# Patient Record
Sex: MALE | Race: WHITE | NOT HISPANIC OR LATINO | Employment: OTHER | URBAN - METROPOLITAN AREA
[De-identification: names, ages, dates, MRNs, and addresses within clinical notes are randomized per-mention and may not be internally consistent; named-entity substitution may affect disease eponyms.]

---

## 2017-03-13 RX ORDER — LISINOPRIL 40 MG/1
TABLET ORAL
Qty: 90 TABLET | Refills: 0 | Status: SHIPPED | OUTPATIENT
Start: 2017-03-13 | End: 2017-06-11 | Stop reason: SDUPTHER

## 2017-03-30 ENCOUNTER — OFFICE VISIT (OUTPATIENT)
Dept: INTERNAL MEDICINE | Facility: CLINIC | Age: 57
End: 2017-03-30

## 2017-03-30 VITALS
HEART RATE: 56 BPM | TEMPERATURE: 98 F | SYSTOLIC BLOOD PRESSURE: 122 MMHG | DIASTOLIC BLOOD PRESSURE: 84 MMHG | BODY MASS INDEX: 27.63 KG/M2 | WEIGHT: 234 LBS | RESPIRATION RATE: 16 BRPM | HEIGHT: 77 IN

## 2017-03-30 DIAGNOSIS — Z23 IMMUNIZATION DUE: ICD-10-CM

## 2017-03-30 DIAGNOSIS — E78.5 HYPERLIPIDEMIA, UNSPECIFIED HYPERLIPIDEMIA TYPE: ICD-10-CM

## 2017-03-30 DIAGNOSIS — K21.9 GASTROESOPHAGEAL REFLUX DISEASE WITHOUT ESOPHAGITIS: ICD-10-CM

## 2017-03-30 DIAGNOSIS — E11.9 TYPE 2 DIABETES MELLITUS WITHOUT COMPLICATION, WITHOUT LONG-TERM CURRENT USE OF INSULIN (HCC): ICD-10-CM

## 2017-03-30 DIAGNOSIS — I10 ESSENTIAL HYPERTENSION: Primary | ICD-10-CM

## 2017-03-30 DIAGNOSIS — Z00.00 PHYSICAL EXAM: ICD-10-CM

## 2017-03-30 DIAGNOSIS — N40.0 ENLARGED PROSTATE WITHOUT LOWER URINARY TRACT SYMPTOMS (LUTS): ICD-10-CM

## 2017-03-30 LAB
A/C: NORMAL
ALBUMIN SERPL-MCNC: 4.6 G/DL (ref 3.2–4.8)
ALBUMIN/GLOB SERPL: 1.8 G/DL (ref 1.5–2.5)
ALP SERPL-CCNC: 65 U/L (ref 25–100)
ALT SERPL W P-5'-P-CCNC: 29 U/L (ref 7–40)
ANION GAP SERPL CALCULATED.3IONS-SCNC: 4 MMOL/L (ref 3–11)
ARTICHOKE IGE QN: 95 MG/DL (ref 0–130)
AST SERPL-CCNC: 32 U/L (ref 0–33)
BILIRUB BLD-MCNC: NEGATIVE MG/DL
BILIRUB SERPL-MCNC: 1.1 MG/DL (ref 0.3–1.2)
BUN BLD-MCNC: 15 MG/DL (ref 9–23)
BUN/CREAT SERPL: 16.7 (ref 7–25)
CALCIUM SPEC-SCNC: 10.1 MG/DL (ref 8.7–10.4)
CHLORIDE SERPL-SCNC: 105 MMOL/L (ref 99–109)
CHOLEST SERPL-MCNC: 160 MG/DL (ref 0–200)
CLARITY, POC: CLEAR
CO2 SERPL-SCNC: 31 MMOL/L (ref 20–31)
COLOR UR: YELLOW
CREAT BLD-MCNC: 0.9 MG/DL (ref 0.6–1.3)
EXPIRATION DATE: NORMAL
EXPIRATION DATE: NORMAL
GFR SERPL CREATININE-BSD FRML MDRD: 87 ML/MIN/1.73
GLOBULIN UR ELPH-MCNC: 2.5 GM/DL
GLUCOSE BLD-MCNC: 117 MG/DL (ref 70–100)
GLUCOSE UR STRIP-MCNC: NEGATIVE MG/DL
HBA1C MFR BLD: 6.2 % (ref 4.8–5.6)
HCV AB SER DONR QL: NORMAL
HDLC SERPL-MCNC: 43 MG/DL (ref 40–60)
KETONES UR QL: NEGATIVE
LEUKOCYTE EST, POC: NEGATIVE
Lab: NORMAL
Lab: NORMAL
NITRITE UR-MCNC: NEGATIVE MG/ML
PH UR: 6.5 [PH] (ref 5–8)
POC CREATININE URINE: 10
POC MICROALBUMIN URINE: 10
POTASSIUM BLD-SCNC: 4.5 MMOL/L (ref 3.5–5.5)
PROT SERPL-MCNC: 7.1 G/DL (ref 5.7–8.2)
PROT UR STRIP-MCNC: NEGATIVE MG/DL
PSA SERPL-MCNC: 0.39 NG/ML (ref 0–4)
RBC # UR STRIP: NEGATIVE /UL
SODIUM BLD-SCNC: 140 MMOL/L (ref 132–146)
SP GR UR: 1.01 (ref 1–1.03)
TRIGL SERPL-MCNC: 140 MG/DL (ref 0–150)
UROBILINOGEN UR QL: NORMAL

## 2017-03-30 PROCEDURE — 99396 PREV VISIT EST AGE 40-64: CPT | Performed by: INTERNAL MEDICINE

## 2017-03-30 PROCEDURE — 90471 IMMUNIZATION ADMIN: CPT | Performed by: INTERNAL MEDICINE

## 2017-03-30 PROCEDURE — 84153 ASSAY OF PSA TOTAL: CPT | Performed by: INTERNAL MEDICINE

## 2017-03-30 PROCEDURE — 80053 COMPREHEN METABOLIC PANEL: CPT | Performed by: INTERNAL MEDICINE

## 2017-03-30 PROCEDURE — 81003 URINALYSIS AUTO W/O SCOPE: CPT | Performed by: INTERNAL MEDICINE

## 2017-03-30 PROCEDURE — 82044 UR ALBUMIN SEMIQUANTITATIVE: CPT | Performed by: INTERNAL MEDICINE

## 2017-03-30 PROCEDURE — 36415 COLL VENOUS BLD VENIPUNCTURE: CPT | Performed by: INTERNAL MEDICINE

## 2017-03-30 PROCEDURE — 83036 HEMOGLOBIN GLYCOSYLATED A1C: CPT | Performed by: INTERNAL MEDICINE

## 2017-03-30 PROCEDURE — 86803 HEPATITIS C AB TEST: CPT | Performed by: INTERNAL MEDICINE

## 2017-03-30 PROCEDURE — 90670 PCV13 VACCINE IM: CPT | Performed by: INTERNAL MEDICINE

## 2017-03-30 PROCEDURE — 80061 LIPID PANEL: CPT | Performed by: INTERNAL MEDICINE

## 2017-06-12 RX ORDER — LISINOPRIL 40 MG/1
TABLET ORAL
Qty: 90 TABLET | Refills: 0 | Status: SHIPPED | OUTPATIENT
Start: 2017-06-12 | End: 2017-09-10 | Stop reason: SDUPTHER

## 2017-06-12 RX ORDER — SIMVASTATIN 40 MG
TABLET ORAL
Qty: 90 TABLET | Refills: 0 | Status: SHIPPED | OUTPATIENT
Start: 2017-06-12 | End: 2017-09-10 | Stop reason: SDUPTHER

## 2017-08-28 RX ORDER — TADALAFIL 5 MG
TABLET ORAL
Qty: 30 TABLET | Refills: 5 | Status: SHIPPED | OUTPATIENT
Start: 2017-08-28 | End: 2018-08-29 | Stop reason: SDUPTHER

## 2017-09-11 RX ORDER — LISINOPRIL 40 MG/1
TABLET ORAL
Qty: 90 TABLET | Refills: 0 | Status: SHIPPED | OUTPATIENT
Start: 2017-09-11 | End: 2017-12-10 | Stop reason: SDUPTHER

## 2017-09-11 RX ORDER — SIMVASTATIN 40 MG
TABLET ORAL
Qty: 90 TABLET | Refills: 0 | Status: SHIPPED | OUTPATIENT
Start: 2017-09-11 | End: 2017-12-10 | Stop reason: SDUPTHER

## 2017-09-26 ENCOUNTER — OFFICE VISIT (OUTPATIENT)
Dept: INTERNAL MEDICINE | Facility: CLINIC | Age: 57
End: 2017-09-26

## 2017-09-26 VITALS
RESPIRATION RATE: 18 BRPM | DIASTOLIC BLOOD PRESSURE: 70 MMHG | TEMPERATURE: 97.7 F | SYSTOLIC BLOOD PRESSURE: 112 MMHG | WEIGHT: 234 LBS | BODY MASS INDEX: 28.11 KG/M2 | HEART RATE: 64 BPM

## 2017-09-26 DIAGNOSIS — K21.9 GASTROESOPHAGEAL REFLUX DISEASE WITHOUT ESOPHAGITIS: ICD-10-CM

## 2017-09-26 DIAGNOSIS — E11.9 TYPE 2 DIABETES MELLITUS WITHOUT COMPLICATION, WITHOUT LONG-TERM CURRENT USE OF INSULIN (HCC): ICD-10-CM

## 2017-09-26 DIAGNOSIS — I10 ESSENTIAL HYPERTENSION: Primary | ICD-10-CM

## 2017-09-26 DIAGNOSIS — E78.5 HYPERLIPIDEMIA, UNSPECIFIED HYPERLIPIDEMIA TYPE: ICD-10-CM

## 2017-09-26 LAB
ALBUMIN SERPL-MCNC: 4.6 G/DL (ref 3.2–4.8)
ALBUMIN/GLOB SERPL: 2.2 G/DL (ref 1.5–2.5)
ALP SERPL-CCNC: 75 U/L (ref 25–100)
ALT SERPL W P-5'-P-CCNC: 32 U/L (ref 7–40)
ANION GAP SERPL CALCULATED.3IONS-SCNC: 0 MMOL/L (ref 3–11)
ARTICHOKE IGE QN: 105 MG/DL (ref 0–130)
AST SERPL-CCNC: 32 U/L (ref 0–33)
BILIRUB SERPL-MCNC: 1 MG/DL (ref 0.3–1.2)
BUN BLD-MCNC: 14 MG/DL (ref 9–23)
BUN/CREAT SERPL: 14 (ref 7–25)
CALCIUM SPEC-SCNC: 9.2 MG/DL (ref 8.7–10.4)
CHLORIDE SERPL-SCNC: 106 MMOL/L (ref 99–109)
CHOLEST SERPL-MCNC: 162 MG/DL (ref 0–200)
CO2 SERPL-SCNC: 33 MMOL/L (ref 20–31)
CREAT BLD-MCNC: 1 MG/DL (ref 0.6–1.3)
GFR SERPL CREATININE-BSD FRML MDRD: 76 ML/MIN/1.73
GLOBULIN UR ELPH-MCNC: 2.1 GM/DL
GLUCOSE BLD-MCNC: 129 MG/DL (ref 70–100)
HBA1C MFR BLD: 6.2 % (ref 4.8–5.6)
HDLC SERPL-MCNC: 44 MG/DL (ref 40–60)
POTASSIUM BLD-SCNC: 4.3 MMOL/L (ref 3.5–5.5)
PROT SERPL-MCNC: 6.7 G/DL (ref 5.7–8.2)
SODIUM BLD-SCNC: 139 MMOL/L (ref 132–146)
TRIGL SERPL-MCNC: 111 MG/DL (ref 0–150)

## 2017-09-26 PROCEDURE — 83036 HEMOGLOBIN GLYCOSYLATED A1C: CPT | Performed by: INTERNAL MEDICINE

## 2017-09-26 PROCEDURE — 80053 COMPREHEN METABOLIC PANEL: CPT | Performed by: INTERNAL MEDICINE

## 2017-09-26 PROCEDURE — 80061 LIPID PANEL: CPT | Performed by: INTERNAL MEDICINE

## 2017-09-26 PROCEDURE — 36415 COLL VENOUS BLD VENIPUNCTURE: CPT | Performed by: INTERNAL MEDICINE

## 2017-09-26 PROCEDURE — 99214 OFFICE O/P EST MOD 30 MIN: CPT | Performed by: INTERNAL MEDICINE

## 2017-09-26 NOTE — PROGRESS NOTES
Chief Complaint   Patient presents with   • Follow-up     6 Month       History of Present Illness    The patient is not on medication for his gastroesophageal reflux. He reports no abdominal pain, belching, chest pain, diarrhea, dysphagia, early satiety, heartburn, hoarseness, nausea, odynophagia, rectal bleeding, vomiting or weight loss. The GERD has no known aggravating factors.    The patient presents for a follow-up related to hypertension. The patient reports that he has had no headaches, dyspnea, edema, syncope, blurred vision or palpitations. He states that he is taking his medication as prescribed. He is not having medication side effects. He does not check his blood pressures at home.    The patient presents for a follow-up related to hyperlipidemia. He is following a low fat diet. He reports that he is exercising. He is taking simvastatin. The patient is taking his medication as prescribed. He reports that he has experienced muscle cramps but denies abdominal pain, headaches or weakness. He denies orthopnea, paroxysmal nocturnal dyspnea or dyspnea on exertion.    The patient presents for a follow-up related to diabetes and reports that he doesn't check his blood sugars at home. He denies hypoglycemic symptoms. The patient denies polyuria, polydypsia or polyphagia. He reports no symptoms of neuropathy. The patient is not on medication for his diabetes. The patient does check his feet daily at home.    Review of Systems    CARDIOVASCULAR- Denies Claudication.    GASTROINTESTINAL- Denies Constipation.    PULMONARY- Denies Wheezing, Sputum Production, Cough, Hemoptysis or Pleuritic Chest Pain.    Medications      Current Outpatient Prescriptions:   •  aspirin (ASPIRIN LOW DOSE) 81 MG tablet, Take  by mouth daily., Disp: , Rfl:   •  CIALIS 5 MG tablet, TAKE ONE TABLET BY MOUTH DAILY, Disp: 30 tablet, Rfl: 5  •  fluticasone (FLONASE) 50 MCG/ACT nasal spray, into each nostril Daily As Needed., Disp: , Rfl:   •   lisinopril (PRINIVIL,ZESTRIL) 40 MG tablet, TAKE 1 TABLET DAILY, Disp: 90 tablet, Rfl: 0  •  simvastatin (ZOCOR) 40 MG tablet, TAKE 1 TABLET DAILY, Disp: 90 tablet, Rfl: 0     Allergies    No Known Allergies    Problem List    Patient Active Problem List   Diagnosis   • Diabetes mellitus   • Hyperlipidemia   • Hypertension   • Gastroesophageal reflux disease   • Enlarged prostate without lower urinary tract symptoms (luts)     Social History     Social History   • Marital status:      Spouse name: N/A   • Number of children: N/A   • Years of education: N/A     Occupational History   • Not on file.     Social History Main Topics   • Smoking status: Never Smoker   • Smokeless tobacco: Not on file   • Alcohol use No   • Drug use: Not on file   • Sexual activity: Not on file     Other Topics Concern   • Not on file     Social History Narrative       Medications, Allergies, Problems List and Past History were reviewed and updated.    Physical Examination    /70 (BP Location: Left arm, Patient Position: Sitting, Cuff Size: Adult)  Pulse 64  Temp 97.7 °F (36.5 °C) (Temporal Artery )   Resp 18  Wt 234 lb (106 kg)  BMI 28.11 kg/m2    HEENT: Head- Normocephalic Atraumatic. Facies- Within normal limits. Pinnas- Normal texture and shape bilaterally. Canals- Normal bilaterally. TMs- Normal bilaterally. Nares- Patent bilaterally. Nasal Septum- is normal. There is no tenderness to palpation over the frontal or maxillary sinuses. Lids- Normal bilaterally. Conjunctiva- Clear bilaterally. Sclera- Anicteric bilaterally. Oropharynx- Moist with no lesions. Tonsils- No enlargement, erythema or exudate.    Neck: Thyroid- non enlarged, symmetric and has no nodules. No bruits are detected. ROM- Normal Range of Motion with no rigidity.    Lungs: Auscultation- Clear to auscultation bilaterally. There are no retractions, clubbing or cyanosis. The Expiratory to Inspiratory ratio is equal.    Cardiovascular: There are no  carotid bruits. Heart- Normal Rate with Regular rhythm and no murmurs. There are no gallops. There are no rubs. In the lower extremities there is no edema. The upper extremities do not have edema.    Abdomen: Soft, benign, non-tender with no masses, hernias, organomegaly or scars.    Impression and Assessment    Gastroesophageal Reflux Disease.    Essential Hypertension.    Hyperlipidemia.    Type 2 Diabetes Mellitus without complication.    Plan    Gastroesophageal Reflux Disease Plan: The current plan was continued.    Essential Hypertension Plan: The current plan was continued.    Hyperlipidemia Plan: The patient was encouraged to take CoEnzyme Q10 at a dosage of 300 mg daily. He was instructed to eat a low fat diet. He was encouraged to exercise daily. The patient was instructed to continue the current medications.    Type 2 Diabetes Mellitus without complication Plan: The condition is stable. No change is needed in the current plan.    [place] was seen today for follow-up.    Diagnoses and all orders for this visit:    Essential hypertension  -     Comprehensive Metabolic Panel    Gastroesophageal reflux disease without esophagitis  -     Comprehensive Metabolic Panel    Hyperlipidemia, unspecified hyperlipidemia type  -     Comprehensive Metabolic Panel  -     Lipid Panel    Type 2 diabetes mellitus without complication, without long-term current use of insulin  -     Comprehensive Metabolic Panel  -     Hemoglobin A1c        Return to Office    The patient was instructed to return for follow-up in 6 months. Next Visit: Physical.    The patient was instructed to return sooner if the condition changes, worsens, or doesn't resolve.

## 2017-10-03 ENCOUNTER — FLU SHOT (OUTPATIENT)
Dept: INTERNAL MEDICINE | Facility: CLINIC | Age: 57
End: 2017-10-03

## 2017-10-03 PROCEDURE — 90471 IMMUNIZATION ADMIN: CPT | Performed by: INTERNAL MEDICINE

## 2017-10-03 PROCEDURE — 90686 IIV4 VACC NO PRSV 0.5 ML IM: CPT | Performed by: INTERNAL MEDICINE

## 2017-12-11 RX ORDER — SIMVASTATIN 40 MG
TABLET ORAL
Qty: 90 TABLET | Refills: 0 | Status: SHIPPED | OUTPATIENT
Start: 2017-12-11 | End: 2018-03-10 | Stop reason: SDUPTHER

## 2017-12-11 RX ORDER — LISINOPRIL 40 MG/1
TABLET ORAL
Qty: 90 TABLET | Refills: 0 | Status: SHIPPED | OUTPATIENT
Start: 2017-12-11 | End: 2018-03-10 | Stop reason: SDUPTHER

## 2018-03-12 RX ORDER — LISINOPRIL 40 MG/1
TABLET ORAL
Qty: 90 TABLET | Refills: 1 | Status: SHIPPED | OUTPATIENT
Start: 2018-03-12 | End: 2018-09-08 | Stop reason: SDUPTHER

## 2018-03-12 RX ORDER — SIMVASTATIN 40 MG
TABLET ORAL
Qty: 90 TABLET | Refills: 1 | Status: SHIPPED | OUTPATIENT
Start: 2018-03-12 | End: 2018-09-08 | Stop reason: SDUPTHER

## 2018-03-27 ENCOUNTER — OFFICE VISIT (OUTPATIENT)
Dept: INTERNAL MEDICINE | Facility: CLINIC | Age: 58
End: 2018-03-27

## 2018-03-27 VITALS
SYSTOLIC BLOOD PRESSURE: 126 MMHG | BODY MASS INDEX: 27.98 KG/M2 | WEIGHT: 237 LBS | DIASTOLIC BLOOD PRESSURE: 74 MMHG | TEMPERATURE: 98.1 F | HEIGHT: 77 IN | HEART RATE: 64 BPM | RESPIRATION RATE: 16 BRPM

## 2018-03-27 DIAGNOSIS — N40.0 ENLARGED PROSTATE WITHOUT LOWER URINARY TRACT SYMPTOMS (LUTS): ICD-10-CM

## 2018-03-27 DIAGNOSIS — I10 ESSENTIAL HYPERTENSION: ICD-10-CM

## 2018-03-27 DIAGNOSIS — Z12.5 PROSTATE CANCER SCREENING: ICD-10-CM

## 2018-03-27 DIAGNOSIS — E11.9 TYPE 2 DIABETES MELLITUS WITHOUT COMPLICATION, WITHOUT LONG-TERM CURRENT USE OF INSULIN (HCC): ICD-10-CM

## 2018-03-27 DIAGNOSIS — Z00.00 PHYSICAL EXAM: ICD-10-CM

## 2018-03-27 DIAGNOSIS — Z23 NEED FOR PNEUMOCOCCAL VACCINE: Primary | ICD-10-CM

## 2018-03-27 DIAGNOSIS — E78.5 HYPERLIPIDEMIA, UNSPECIFIED HYPERLIPIDEMIA TYPE: ICD-10-CM

## 2018-03-27 DIAGNOSIS — K21.9 GASTROESOPHAGEAL REFLUX DISEASE WITHOUT ESOPHAGITIS: ICD-10-CM

## 2018-03-27 LAB
ALBUMIN SERPL-MCNC: 4.5 G/DL (ref 3.2–4.8)
ALBUMIN/GLOB SERPL: 2 G/DL (ref 1.5–2.5)
ALP SERPL-CCNC: 70 U/L (ref 25–100)
ALT SERPL W P-5'-P-CCNC: 42 U/L (ref 7–40)
ALUMINUM/CREAT UR: <30
ANION GAP SERPL CALCULATED.3IONS-SCNC: 7 MMOL/L (ref 3–11)
ARTICHOKE IGE QN: 111 MG/DL (ref 0–130)
AST SERPL-CCNC: 42 U/L (ref 0–33)
BILIRUB BLD-MCNC: NEGATIVE MG/DL
BILIRUB SERPL-MCNC: 1.1 MG/DL (ref 0.3–1.2)
BUN BLD-MCNC: 17 MG/DL (ref 9–23)
BUN/CREAT SERPL: 17 (ref 7–25)
CALCIUM SPEC-SCNC: 9.4 MG/DL (ref 8.7–10.4)
CHLORIDE SERPL-SCNC: 104 MMOL/L (ref 99–109)
CHOLEST SERPL-MCNC: 168 MG/DL (ref 0–200)
CLARITY, POC: CLEAR
CO2 SERPL-SCNC: 27 MMOL/L (ref 20–31)
COLOR UR: YELLOW
CREAT BLD-MCNC: 1 MG/DL (ref 0.6–1.3)
EXPIRATION DATE: NORMAL
EXPIRATION DATE: NORMAL
GFR SERPL CREATININE-BSD FRML MDRD: 76 ML/MIN/1.73
GLOBULIN UR ELPH-MCNC: 2.2 GM/DL
GLUCOSE BLD-MCNC: 126 MG/DL (ref 70–100)
GLUCOSE UR STRIP-MCNC: NEGATIVE MG/DL
HBA1C MFR BLD: 7.2 % (ref 4.8–5.6)
HDLC SERPL-MCNC: 42 MG/DL (ref 40–60)
KETONES UR QL: NEGATIVE
LEUKOCYTE EST, POC: NEGATIVE
Lab: NORMAL
Lab: NORMAL
NITRITE UR-MCNC: NEGATIVE MG/ML
PH UR: 5 [PH] (ref 5–8)
POC CREATININE URINE: 100
POC MICROALBUMIN URINE: 10
POTASSIUM BLD-SCNC: 4.5 MMOL/L (ref 3.5–5.5)
PROT SERPL-MCNC: 6.7 G/DL (ref 5.7–8.2)
PROT UR STRIP-MCNC: NEGATIVE MG/DL
PSA SERPL-MCNC: 0.51 NG/ML (ref 0–4)
RBC # UR STRIP: NEGATIVE /UL
SODIUM BLD-SCNC: 138 MMOL/L (ref 132–146)
SP GR UR: 1.01 (ref 1–1.03)
TRIGL SERPL-MCNC: 156 MG/DL (ref 0–150)
UROBILINOGEN UR QL: NORMAL

## 2018-03-27 PROCEDURE — 82044 UR ALBUMIN SEMIQUANTITATIVE: CPT | Performed by: INTERNAL MEDICINE

## 2018-03-27 PROCEDURE — 99396 PREV VISIT EST AGE 40-64: CPT | Performed by: INTERNAL MEDICINE

## 2018-03-27 PROCEDURE — 81003 URINALYSIS AUTO W/O SCOPE: CPT | Performed by: INTERNAL MEDICINE

## 2018-03-27 PROCEDURE — 90471 IMMUNIZATION ADMIN: CPT | Performed by: INTERNAL MEDICINE

## 2018-03-27 PROCEDURE — G0103 PSA SCREENING: HCPCS | Performed by: INTERNAL MEDICINE

## 2018-03-27 PROCEDURE — 36415 COLL VENOUS BLD VENIPUNCTURE: CPT | Performed by: INTERNAL MEDICINE

## 2018-03-27 PROCEDURE — 83036 HEMOGLOBIN GLYCOSYLATED A1C: CPT | Performed by: INTERNAL MEDICINE

## 2018-03-27 PROCEDURE — 80061 LIPID PANEL: CPT | Performed by: INTERNAL MEDICINE

## 2018-03-27 PROCEDURE — 80053 COMPREHEN METABOLIC PANEL: CPT | Performed by: INTERNAL MEDICINE

## 2018-03-27 PROCEDURE — 90732 PPSV23 VACC 2 YRS+ SUBQ/IM: CPT | Performed by: INTERNAL MEDICINE

## 2018-03-27 NOTE — PROGRESS NOTES
Chief Complaint   Patient presents with   • Annual Exam       History of Present Illness      The patient presents for an established patient physical examination and health maintenance visit.    The patient presents for a follow-up related to Type 2 Diabetes Mellitus and reports that he doesn't check his blood sugars at home. He denies hypoglycemic symptoms. The patient denies polyuria, polydypsia or polyphagia. He reports no symptoms of neuropathy. The patient is not on medication for his Type 2 Diabetes Mellitus. The patient does check his feet daily at home. He denies chest pain, shortness of breath, orthopnea, paroxysmal nocturnal dyspnea, dyspnea on exertion, edema, palpitations or syncope.    The patient is not on medication for his gastroesophageal reflux. He reports no abdominal pain, belching, diarrhea, dysphagia, early satiety, heartburn, hoarseness, nausea, odynophagia, rectal bleeding, vomiting or weight loss. The GERD has no known aggravating factors.    The patient presents for a follow-up related to hyperlipidemia. He is following a low fat diet. He reports that he is exercising. He is not taking medication for hyperlipidemia.    The patient presents for a follow-up related to hypertension. The patient reports that he has had no headaches or blurred vision. He states that he is taking his medication as prescribed. He is not having medication side effects.    Review of Systems    GENERAL- Denies Fever, Chills, Sweats, Fatigue, Weakness or Malaise.    HEENT- Denies Eye Pain, Eye Drainage, Nasal Discharge, Sore Throat, Ear Pain, Ear Drainage, Decreased Vision, Sinus Pain, Nasal Congestion, Decreased Hearing, Visual Disturbance, Diplopia or Tinnitus.    NECK- Denies Decreased Range of Motion, Stiffness, Thyroid Nodules, Enlarged Lymph Nodes or Goiter.    CARDIOVASCULAR- Denies Claudication.    PULMONARY- Denies Wheezing, Sputum Production, Cough, Hemoptysis or Pleuritic Chest Pain.    GASTROINTESTINAL-  Denies Constipation.    GENITOURINARY- Denies Penile Discharge, Erectile Dysfunction, Testicular Mass, Testicular Pain, Hernia, Nocturia, Decreased Urine Stream, Incomplete Voiding, Urinary Frequency, Urinary Urgency, Dysuria or Hematuria.    ENDOCRINE- Denies Cold Intolerance, Depression, Hair Loss, Heat Intolerance, Memory Loss, Sleep Disturbance or Weight Gain.    NEUROLOGIC- Denies Seizures, Confusion or Excessive Daytime Sleepiness.    MUSCULOSKELETAL- Denies Joint Pain, Joint Stiffness, Decreased Range of Motion, Joint Swelling or Erythema of Joints.    INTEGUMENTARY- Denies Rash, Lumps, Sores, Itching, Dryness, Color Change, Changes in Hair, Brittle Nails, Discolored Nails, Thickened Nails, Growths or Alopecia.    Medications      Current Outpatient Prescriptions:   •  aspirin (ASPIRIN LOW DOSE) 81 MG tablet, Take  by mouth daily., Disp: , Rfl:   •  CIALIS 5 MG tablet, TAKE ONE TABLET BY MOUTH DAILY, Disp: 30 tablet, Rfl: 5  •  fluticasone (FLONASE) 50 MCG/ACT nasal spray, into each nostril Daily As Needed., Disp: , Rfl:   •  lisinopril (PRINIVIL,ZESTRIL) 40 MG tablet, TAKE 1 TABLET DAILY, Disp: 90 tablet, Rfl: 1  •  simvastatin (ZOCOR) 40 MG tablet, TAKE 1 TABLET DAILY, Disp: 90 tablet, Rfl: 1     Allergies    No Known Allergies    Problem List    Patient Active Problem List   Diagnosis   • Diabetes mellitus   • Hyperlipidemia   • Hypertension   • Gastroesophageal reflux disease   • Enlarged prostate without lower urinary tract symptoms (luts)     Past Medical History:   Diagnosis Date   • GERD (gastroesophageal reflux disease)    • Hyperlipidemia    • Hypertension      Past Surgical History:   Procedure Laterality Date   • VASECTOMY       Social History     Social History   • Marital status:      Social History Main Topics   • Smoking status: Never Smoker   • Alcohol use No   • Drug use: Unknown     Other Topics Concern   • Not on file     Immunization History   Administered Date(s) Administered   •  "Flu Vaccine Quad PF >36MO 10/03/2017   • Pneumococcal Conjugate 13-Valent (PCV13) 03/30/2017   • Tdap 09/24/2010   • influenza Split 10/20/2016     Health Maintenance   Topic Date Due   • DIABETIC FOOT EXAM  1960   • DIABETIC EYE EXAM  1960   • PNEUMOCOCCAL VACCINE (19-64 MEDIUM RISK) (1 of 1 - PPSV23) 05/25/2017   • HEMOGLOBIN A1C  03/26/2018   • URINE MICROALBUMIN  03/30/2018   • LIPID PANEL  09/26/2018   • COLONOSCOPY  02/24/2020   • TDAP/TD VACCINES (2 - Td) 09/24/2020   • HEPATITIS C SCREENING  Completed   • INFLUENZA VACCINE  Completed       Medications, Allergies, Problems List and Past History were reviewed and updated.    Physical Examination    /74 (BP Location: Right arm, Patient Position: Sitting, Cuff Size: Adult)   Pulse 64   Temp 98.1 °F (36.7 °C) (Temporal Artery )   Resp 16   Ht 194.3 cm (76.5\")   Wt 108 kg (237 lb)   BMI 28.47 kg/m²     HEENT: Head- Normocephalic Atraumatic. Facies- Within normal limits. Pinnas- Normal texture and shape bilaterally. Canals- Normal bilaterally. TMs- Normal bilaterally. Nares- Patent bilaterally. Nasal Septum- is normal. There is no tenderness to palpation over the frontal or maxillary sinuses. Lids- Normal bilaterally. Conjunctiva- Clear bilaterally. Sclera- Anicteric bilaterally. Oropharynx- Moist with no lesions. Tonsils- No enlargement, erythema or exudate.    Neck: Thyroid- non enlarged, symmetric and has no nodules. No bruits are detected. ROM- Normal Range of Motion with no rigidity.    Lymph Nodes: Cervical- no enlarged lymph nodes noted. Clavicular- Deferred. Axillary- Deferred. Inguinal- Deferred.    Lungs: Auscultation- Clear to auscultation bilaterally. There are no retractions, clubbing or cyanosis. The Expiratory to Inspiratory ratio is equal.    Cardiovascular: There are no carotid bruits. Heart- Normal Rate with Regular rhythm and no murmurs. There are no gallops. There are no rubs. In the lower extremities there is no edema. " The upper extremities do not have edema.    Abdomen: Soft, benign, non-tender with no masses, hernias, organomegaly or scars.    Male Genitourinary: The penis is uncircumcised with testicles found in the scrotum bilaterally. Testicular Size is normal bilaterally. The penis has no anatomic abnormalities.    Rectal: reveals normal external sphincter tone with no external lesions.    Prostate: The prostate gland is enlarged diffusely with no nodules.    Feet: The feet are symmetric with normal peter landmarks. There is no tenderness to palpation bilaterally. The feet have normal posterior tibial and dorsalis pedis pulses and normal capillary refill bilaterally. The monofilament examination is normal bilaterally.       The arches are normal bilaterally. There are no skin or nail lesions present. There are no ingrown nails. There are no bunions noted.    The patient has no worrisome or suspicious skin lesions noted.    Impression and Assessment    Normal Physical Examination.    Encounter for Immunization Administration.    Type 2 Diabetes Mellitus.    Gastroesophageal Reflux Disease.    Hyperlipidemia.    Essential Hypertension.    Benign Prostatic Hypertrophy.    Plan    Gastroesophageal Reflux Disease Plan: The current plan was continued.    Hyperlipidemia Plan: He was instructed to eat a low fat diet. He was encouraged to exercise daily. The patient was instructed to continue the current medications.    Essential Hypertension Plan: The current plan was continued.    Type 2 Diabetes Mellitus Plan: The current plan was continued.    Benign Prostatic Hypertrophy Plan: The condition is stable. No change is needed in the current plan.    Counseling was provided regarding: Dental Visits, Flossing Teeth, Heart Healthy Diet and Seat Belt Utilization.    The following was ordered for screening and health maintenance: PSA.    Counseled regarding immunizations and applicable VIS given.    Immunizations Ordered and Administered:  Anders Perry was seen today for annual exam.    Diagnoses and all orders for this visit:    Need for pneumococcal vaccine  -     Pneumococcal polysaccharide vaccine 23-valent >= 3yo subcutaneous/IM (PPSV23)    Physical exam    Essential hypertension  -     Comprehensive Metabolic Panel  -     POC Urinalysis Dipstick, Automated    Gastroesophageal reflux disease without esophagitis  -     Comprehensive Metabolic Panel    Hyperlipidemia, unspecified hyperlipidemia type  -     Comprehensive Metabolic Panel  -     Lipid Panel    Type 2 diabetes mellitus without complication, without long-term current use of insulin  -     Comprehensive Metabolic Panel  -     Hemoglobin A1c  -     POC Microalbumin    Prostate cancer screening  -     PSA Screen    Enlarged prostate without lower urinary tract symptoms (luts)         Return to Office    The patient was instructed to return for follow-up in 1 year.    The patient was instructed to return sooner if the condition changes, worsens, or doesn't resolve.

## 2018-08-29 RX ORDER — TADALAFIL 5 MG
TABLET ORAL
Qty: 30 TABLET | Refills: 5 | Status: SHIPPED | OUTPATIENT
Start: 2018-08-29 | End: 2018-10-03 | Stop reason: SDUPTHER

## 2018-09-10 RX ORDER — LISINOPRIL 40 MG/1
TABLET ORAL
Qty: 90 TABLET | Refills: 1 | Status: SHIPPED | OUTPATIENT
Start: 2018-09-10 | End: 2019-03-09 | Stop reason: SDUPTHER

## 2018-09-10 RX ORDER — SIMVASTATIN 40 MG
TABLET ORAL
Qty: 90 TABLET | Refills: 1 | Status: SHIPPED | OUTPATIENT
Start: 2018-09-10 | End: 2019-03-09 | Stop reason: SDUPTHER

## 2018-10-03 ENCOUNTER — OFFICE VISIT (OUTPATIENT)
Dept: INTERNAL MEDICINE | Facility: CLINIC | Age: 58
End: 2018-10-03

## 2018-10-03 VITALS
BODY MASS INDEX: 28.35 KG/M2 | HEART RATE: 52 BPM | WEIGHT: 236 LBS | TEMPERATURE: 97.9 F | RESPIRATION RATE: 16 BRPM | DIASTOLIC BLOOD PRESSURE: 82 MMHG | SYSTOLIC BLOOD PRESSURE: 118 MMHG

## 2018-10-03 DIAGNOSIS — E11.9 TYPE 2 DIABETES MELLITUS WITHOUT COMPLICATION, WITHOUT LONG-TERM CURRENT USE OF INSULIN (HCC): ICD-10-CM

## 2018-10-03 DIAGNOSIS — E78.5 HYPERLIPIDEMIA, UNSPECIFIED HYPERLIPIDEMIA TYPE: ICD-10-CM

## 2018-10-03 DIAGNOSIS — K21.9 GASTROESOPHAGEAL REFLUX DISEASE WITHOUT ESOPHAGITIS: ICD-10-CM

## 2018-10-03 DIAGNOSIS — Z23 NEED FOR INFLUENZA VACCINATION: Primary | ICD-10-CM

## 2018-10-03 DIAGNOSIS — I10 ESSENTIAL HYPERTENSION: ICD-10-CM

## 2018-10-03 LAB
ALBUMIN SERPL-MCNC: 4.59 G/DL (ref 3.2–4.8)
ALBUMIN/GLOB SERPL: 2.5 G/DL (ref 1.5–2.5)
ALP SERPL-CCNC: 73 U/L (ref 25–100)
ALT SERPL W P-5'-P-CCNC: 34 U/L (ref 7–40)
ANION GAP SERPL CALCULATED.3IONS-SCNC: 5 MMOL/L (ref 3–11)
ARTICHOKE IGE QN: 83 MG/DL (ref 0–130)
AST SERPL-CCNC: 35 U/L (ref 0–33)
BILIRUB SERPL-MCNC: 0.9 MG/DL (ref 0.3–1.2)
BUN BLD-MCNC: 13 MG/DL (ref 9–23)
BUN/CREAT SERPL: 13.3 (ref 7–25)
CALCIUM SPEC-SCNC: 9.3 MG/DL (ref 8.7–10.4)
CHLORIDE SERPL-SCNC: 106 MMOL/L (ref 99–109)
CHOLEST SERPL-MCNC: 133 MG/DL (ref 0–200)
CO2 SERPL-SCNC: 29 MMOL/L (ref 20–31)
CREAT BLD-MCNC: 0.98 MG/DL (ref 0.6–1.3)
GFR SERPL CREATININE-BSD FRML MDRD: 78 ML/MIN/1.73
GLOBULIN UR ELPH-MCNC: 1.8 GM/DL
GLUCOSE BLD-MCNC: 118 MG/DL (ref 70–100)
HBA1C MFR BLD: 7 % (ref 4.8–5.6)
HDLC SERPL-MCNC: 41 MG/DL (ref 40–60)
POTASSIUM BLD-SCNC: 4.2 MMOL/L (ref 3.5–5.5)
PROT SERPL-MCNC: 6.4 G/DL (ref 5.7–8.2)
SODIUM BLD-SCNC: 140 MMOL/L (ref 132–146)
TRIGL SERPL-MCNC: 96 MG/DL (ref 0–150)

## 2018-10-03 PROCEDURE — 83036 HEMOGLOBIN GLYCOSYLATED A1C: CPT | Performed by: INTERNAL MEDICINE

## 2018-10-03 PROCEDURE — 99214 OFFICE O/P EST MOD 30 MIN: CPT | Performed by: INTERNAL MEDICINE

## 2018-10-03 PROCEDURE — 80061 LIPID PANEL: CPT | Performed by: INTERNAL MEDICINE

## 2018-10-03 PROCEDURE — 80053 COMPREHEN METABOLIC PANEL: CPT | Performed by: INTERNAL MEDICINE

## 2018-10-03 PROCEDURE — 36415 COLL VENOUS BLD VENIPUNCTURE: CPT | Performed by: INTERNAL MEDICINE

## 2018-10-03 PROCEDURE — 90471 IMMUNIZATION ADMIN: CPT | Performed by: INTERNAL MEDICINE

## 2018-10-03 PROCEDURE — 90686 IIV4 VACC NO PRSV 0.5 ML IM: CPT | Performed by: INTERNAL MEDICINE

## 2018-10-03 RX ORDER — TADALAFIL 5 MG/1
5 TABLET ORAL DAILY
Qty: 30 TABLET | Refills: 5 | Status: SHIPPED | OUTPATIENT
Start: 2018-10-03 | End: 2019-04-19 | Stop reason: SDUPTHER

## 2018-10-03 NOTE — PROGRESS NOTES
Chief Complaint   Patient presents with   • Follow-up     6 MONTH FOLLOW UP CHRONIC MEDICAL PROBLEMS        History of Present Illness      The patient presents for a follow-up related to Type 2 Diabetes Mellitus and reports that he doesn't check his blood sugars at home. He denies hypoglycemic symptoms. The patient denies polyuria, polydypsia or polyphagia. He reports no symptoms of neuropathy. The patient is not on medication for his Type 2 Diabetes Mellitus. The patient does check his feet daily at home. He denies chest pain, shortness of breath, orthopnea, paroxysmal nocturnal dyspnea, dyspnea on exertion, edema, palpitations or syncope.    The patient presents for a follow-up related to hypertension. The patient reports that he has had no headaches or blurred vision. He states that he is taking his medication as prescribed. He is not having medication side effects.    The patient presents for a follow-up related to GERD. The patient is not on medication for his gastroesophageal reflux. He reports no abdominal pain, belching, diarrhea, dysphagia, early satiety, heartburn, hoarseness, nausea, odynophagia, rectal bleeding, vomiting or weight loss. The GERD has no known aggravating factors.    The patient presents for a follow-up related to hyperlipidemia. He is following a low fat diet. He reports that he is exercising. He is taking simvastatin. The patient is taking his medication as prescribed. He reports no medication side effects, including muscle cramps, abdominal pain, headaches or weakness.    Review of Systems    GENERAL/CONSTITUTIONAL- Denies Fever, Chills, Sweats, Fatigue, Weakness or Malaise.    HEENT- Denies Eye Pain, Eye Drainage, Nasal Discharge, Sore Throat, Ear Pain, Ear Drainage, Decreased Vision, Sinus Pain, Nasal Congestion, Decreased Hearing, Visual Disturbance, Diplopia or Tinnitus.    CARDIOVASCULAR- Denies Claudication.    GASTROINTESTINAL- Denies Constipation.    PULMONARY- Denies Wheezing,  Sputum Production, Cough, Hemoptysis or Pleuritic Chest Pain.    Medications      Current Outpatient Prescriptions:   •  aspirin (ASPIRIN LOW DOSE) 81 MG tablet, Take  by mouth daily., Disp: , Rfl:   •  fluticasone (FLONASE) 50 MCG/ACT nasal spray, into each nostril Daily As Needed., Disp: , Rfl:   •  lisinopril (PRINIVIL,ZESTRIL) 40 MG tablet, TAKE 1 TABLET DAILY, Disp: 90 tablet, Rfl: 1  •  simvastatin (ZOCOR) 40 MG tablet, TAKE 1 TABLET DAILY, Disp: 90 tablet, Rfl: 1  •  tadalafil (CIALIS) 5 MG tablet, Take 1 tablet by mouth Daily., Disp: 30 tablet, Rfl: 5     Allergies    No Known Allergies    Problem List    Patient Active Problem List   Diagnosis   • Diabetes mellitus (CMS/HCC)   • Hyperlipidemia   • Hypertension   • Gastroesophageal reflux disease   • Enlarged prostate without lower urinary tract symptoms (luts)       Medications, Allergies, Problems List and Past History were reviewed and updated.    Physical Examination    /82   Pulse 52   Temp 97.9 °F (36.6 °C) (Temporal Artery )   Resp 16   Wt 107 kg (236 lb)   BMI 28.35 kg/m²     HEENT: Facies- Within normal limits. Lids- Normal bilaterally. Conjunctiva- Clear bilaterally. Sclera- Anicteric bilaterally.    Neck: Thyroid- non enlarged, symmetric and has no nodules. No bruits are detected. ROM- Normal Range of Motion with no rigidity.    Lungs: Auscultation- Clear to auscultation bilaterally. There are no retractions, clubbing or cyanosis. The Expiratory to Inspiratory ratio is equal.    Cardiovascular: There are no carotid bruits. Heart- Normal Rate with Regular rhythm and no murmurs. There are no gallops. There are no rubs. In the lower extremities there is no edema. The upper extremities do not have edema.    Abdomen: Soft, benign, non-tender with no masses, hernias, organomegaly or scars.    Feet: The feet are symmetric with normal peter landmarks. There is no tenderness to palpation bilaterally. The feet have normal posterior tibial and  dorsalis pedis pulses and normal capillary refill bilaterally. The monofilament examination is normal bilaterally.       The arches are normal bilaterally. There are no skin or nail lesions present. There are no ingrown nails. There are no bunions noted.    Impression and Assessment    Type 2 Diabetes Mellitus.    Essential Hypertension.    Gastroesophageal Reflux Disease.    Hyperlipidemia.    Plan    Gastroesophageal Reflux Disease Plan: The condition is stable. No change is needed in the current plan.    Essential Hypertension Plan: The current plan was continued.    Hyperlipidemia Plan: The patient was instructed to exercise daily, eat a low fat diet and continue his medications.    Type 2 Diabetes Mellitus Plan: Further plans will be formulated after test results are reviewed.    [place] was seen today for follow-up.    Diagnoses and all orders for this visit:    Essential hypertension  -     Comprehensive Metabolic Panel    Gastroesophageal reflux disease without esophagitis  -     Comprehensive Metabolic Panel    Hyperlipidemia, unspecified hyperlipidemia type  -     Comprehensive Metabolic Panel  -     Lipid Panel    Type 2 diabetes mellitus without complication, without long-term current use of insulin (CMS/[facility])  -     Comprehensive Metabolic Panel  -     Hemoglobin A1c    Other orders  -     tadalafil (CIALIS) 5 MG tablet; Take 1 tablet by mouth Daily.        Return to Office    The patient was instructed to return for follow-up in 6 months. Next Visit: Physical.    The patient was instructed to return sooner if the condition changes, worsens, or doesn't resolve.

## 2019-03-11 RX ORDER — LISINOPRIL 40 MG/1
TABLET ORAL
Qty: 90 TABLET | Refills: 1 | Status: SHIPPED | OUTPATIENT
Start: 2019-03-11 | End: 2019-09-07 | Stop reason: SDUPTHER

## 2019-03-11 RX ORDER — SIMVASTATIN 40 MG
TABLET ORAL
Qty: 90 TABLET | Refills: 1 | Status: SHIPPED | OUTPATIENT
Start: 2019-03-11 | End: 2019-09-07 | Stop reason: SDUPTHER

## 2019-04-17 ENCOUNTER — OFFICE VISIT (OUTPATIENT)
Dept: INTERNAL MEDICINE | Facility: CLINIC | Age: 59
End: 2019-04-17

## 2019-04-17 VITALS
HEART RATE: 56 BPM | SYSTOLIC BLOOD PRESSURE: 134 MMHG | RESPIRATION RATE: 12 BRPM | HEIGHT: 76 IN | BODY MASS INDEX: 28.62 KG/M2 | WEIGHT: 235 LBS | DIASTOLIC BLOOD PRESSURE: 82 MMHG

## 2019-04-17 DIAGNOSIS — E78.5 HYPERLIPIDEMIA, UNSPECIFIED HYPERLIPIDEMIA TYPE: ICD-10-CM

## 2019-04-17 DIAGNOSIS — Z23 IMMUNIZATION DUE: ICD-10-CM

## 2019-04-17 DIAGNOSIS — E11.9 TYPE 2 DIABETES MELLITUS WITHOUT COMPLICATION, WITHOUT LONG-TERM CURRENT USE OF INSULIN (HCC): ICD-10-CM

## 2019-04-17 DIAGNOSIS — Z12.5 PROSTATE CANCER SCREENING: ICD-10-CM

## 2019-04-17 DIAGNOSIS — I10 ESSENTIAL HYPERTENSION: ICD-10-CM

## 2019-04-17 DIAGNOSIS — Z00.00 PHYSICAL EXAM: Primary | ICD-10-CM

## 2019-04-17 DIAGNOSIS — K21.9 GASTROESOPHAGEAL REFLUX DISEASE WITHOUT ESOPHAGITIS: ICD-10-CM

## 2019-04-17 LAB
A/C: NORMAL
ALBUMIN SERPL-MCNC: 4.4 G/DL (ref 3.5–5.2)
ALBUMIN/GLOB SERPL: 1.6 G/DL
ALP SERPL-CCNC: 73 U/L (ref 39–117)
ALT SERPL W P-5'-P-CCNC: 22 U/L (ref 1–41)
ANION GAP SERPL CALCULATED.3IONS-SCNC: 9.9 MMOL/L
AST SERPL-CCNC: 27 U/L (ref 1–40)
BASOPHILS # BLD AUTO: 0.06 10*3/MM3 (ref 0–0.2)
BASOPHILS NFR BLD AUTO: 1.1 % (ref 0–1.5)
BILIRUB BLD-MCNC: NEGATIVE MG/DL
BILIRUB SERPL-MCNC: 0.6 MG/DL (ref 0.2–1.2)
BUN BLD-MCNC: 11 MG/DL (ref 8–23)
BUN/CREAT SERPL: 11.1 (ref 7–25)
CALCIUM SPEC-SCNC: 9.9 MG/DL (ref 8.6–10.5)
CHLORIDE SERPL-SCNC: 101 MMOL/L (ref 98–107)
CHOLEST SERPL-MCNC: 146 MG/DL (ref 0–200)
CLARITY, POC: CLEAR
CO2 SERPL-SCNC: 28.1 MMOL/L (ref 22–29)
COLOR UR: YELLOW
CREAT BLD-MCNC: 0.99 MG/DL (ref 0.76–1.27)
DEPRECATED RDW RBC AUTO: 48.9 FL (ref 37–54)
EOSINOPHIL # BLD AUTO: 0.35 10*3/MM3 (ref 0–0.4)
EOSINOPHIL NFR BLD AUTO: 6.2 % (ref 0.3–6.2)
ERYTHROCYTE [DISTWIDTH] IN BLOOD BY AUTOMATED COUNT: 13.8 % (ref 12.3–15.4)
EXPIRATION DATE: NORMAL
EXPIRATION DATE: NORMAL
GFR SERPL CREATININE-BSD FRML MDRD: 77 ML/MIN/1.73
GLOBULIN UR ELPH-MCNC: 2.7 GM/DL
GLUCOSE BLD-MCNC: 136 MG/DL (ref 65–99)
GLUCOSE UR STRIP-MCNC: NEGATIVE MG/DL
HBA1C MFR BLD: 6.77 % (ref 4.8–5.6)
HCT VFR BLD AUTO: 46.3 % (ref 37.5–51)
HDLC SERPL-MCNC: 42 MG/DL (ref 40–60)
HGB BLD-MCNC: 15 G/DL (ref 13–17.7)
IMM GRANULOCYTES # BLD AUTO: 0.04 10*3/MM3 (ref 0–0.05)
IMM GRANULOCYTES NFR BLD AUTO: 0.7 % (ref 0–0.5)
KETONES UR QL: NEGATIVE
LDLC SERPL CALC-MCNC: 77 MG/DL (ref 0–100)
LDLC/HDLC SERPL: 1.84 {RATIO}
LEUKOCYTE EST, POC: NEGATIVE
LYMPHOCYTES # BLD AUTO: 1.65 10*3/MM3 (ref 0.7–3.1)
LYMPHOCYTES NFR BLD AUTO: 29.4 % (ref 19.6–45.3)
Lab: NORMAL
Lab: NORMAL
MCH RBC QN AUTO: 30.8 PG (ref 26.6–33)
MCHC RBC AUTO-ENTMCNC: 32.4 G/DL (ref 31.5–35.7)
MCV RBC AUTO: 95.1 FL (ref 79–97)
MONOCYTES # BLD AUTO: 0.6 10*3/MM3 (ref 0.1–0.9)
MONOCYTES NFR BLD AUTO: 10.7 % (ref 5–12)
NEUTROPHILS # BLD AUTO: 2.92 10*3/MM3 (ref 1.4–7)
NEUTROPHILS NFR BLD AUTO: 51.9 % (ref 42.7–76)
NITRITE UR-MCNC: NEGATIVE MG/ML
NRBC BLD AUTO-RTO: 0 /100 WBC (ref 0–0)
PH UR: 5 [PH] (ref 5–8)
PLATELET # BLD AUTO: 184 10*3/MM3 (ref 140–450)
PMV BLD AUTO: 12 FL (ref 6–12)
POC CREATININE URINE: 50
POC MICROALBUMIN URINE: 10
POTASSIUM BLD-SCNC: 4.2 MMOL/L (ref 3.5–5.2)
PROT SERPL-MCNC: 7.1 G/DL (ref 6–8.5)
PROT UR STRIP-MCNC: NEGATIVE MG/DL
PSA SERPL-MCNC: 0.6 NG/ML (ref 0–4)
RBC # BLD AUTO: 4.87 10*6/MM3 (ref 4.14–5.8)
RBC # UR STRIP: NEGATIVE /UL
SODIUM BLD-SCNC: 139 MMOL/L (ref 136–145)
SP GR UR: 1 (ref 1–1.03)
TRIGL SERPL-MCNC: 134 MG/DL (ref 0–150)
TSH SERPL DL<=0.05 MIU/L-ACNC: 1.51 MIU/ML (ref 0.27–4.2)
UROBILINOGEN UR QL: NORMAL
VLDLC SERPL-MCNC: 26.8 MG/DL (ref 5–40)
WBC NRBC COR # BLD: 5.62 10*3/MM3 (ref 3.4–10.8)

## 2019-04-17 PROCEDURE — 36415 COLL VENOUS BLD VENIPUNCTURE: CPT | Performed by: INTERNAL MEDICINE

## 2019-04-17 PROCEDURE — 84443 ASSAY THYROID STIM HORMONE: CPT | Performed by: INTERNAL MEDICINE

## 2019-04-17 PROCEDURE — 82044 UR ALBUMIN SEMIQUANTITATIVE: CPT | Performed by: INTERNAL MEDICINE

## 2019-04-17 PROCEDURE — 85025 COMPLETE CBC W/AUTO DIFF WBC: CPT | Performed by: INTERNAL MEDICINE

## 2019-04-17 PROCEDURE — 80061 LIPID PANEL: CPT | Performed by: INTERNAL MEDICINE

## 2019-04-17 PROCEDURE — G0103 PSA SCREENING: HCPCS | Performed by: INTERNAL MEDICINE

## 2019-04-17 PROCEDURE — 90471 IMMUNIZATION ADMIN: CPT | Performed by: INTERNAL MEDICINE

## 2019-04-17 PROCEDURE — 80053 COMPREHEN METABOLIC PANEL: CPT | Performed by: INTERNAL MEDICINE

## 2019-04-17 PROCEDURE — 81003 URINALYSIS AUTO W/O SCOPE: CPT | Performed by: INTERNAL MEDICINE

## 2019-04-17 PROCEDURE — 90632 HEPA VACCINE ADULT IM: CPT | Performed by: INTERNAL MEDICINE

## 2019-04-17 PROCEDURE — 83036 HEMOGLOBIN GLYCOSYLATED A1C: CPT | Performed by: INTERNAL MEDICINE

## 2019-04-17 PROCEDURE — 99396 PREV VISIT EST AGE 40-64: CPT | Performed by: INTERNAL MEDICINE

## 2019-04-17 NOTE — PROGRESS NOTES
Chief Complaint   Patient presents with   • Annual Exam       History of Present Illness    The patient presents for an established patient physical examination and health maintenance visit.    The patient presents for a follow-up related to Type 2 Diabetes Mellitus and reports that he doesn't check his blood sugars at home. He denies hypoglycemic symptoms. The patient denies polyuria, polydypsia or polyphagia. He reports no symptoms of neuropathy. The patient is not on medication for his Type 2 Diabetes Mellitus. The patient does check his feet daily at home. He denies chest pain, shortness of breath, orthopnea, paroxysmal nocturnal dyspnea, dyspnea on exertion, edema, palpitations or syncope.    The patient presents for a follow-up related to hyperlipidemia. He is following a low fat diet. He reports that he is exercising. He is taking simvastatin. The patient is taking his medication as prescribed. He reports no medication side effects, including muscle cramps, abdominal pain, headaches or weakness.    The patient presents for a follow-up related to GERD. The patient is not on medication for his gastroesophageal reflux. He reports no abdominal pain, belching, diarrhea, dysphagia, early satiety, heartburn, hoarseness, nausea, odynophagia, rectal bleeding, vomiting or weight loss. The GERD has no known aggravating factors.    The patient presents for a follow-up related to hypertension. The patient reports that he has had no headaches or blurred vision. He states that he is taking his medication as prescribed. He is not having medication side effects.    Review of Systems    CONSTITUTIONAL- Denies Fever, Chills, Sweats, Fatigue, Weakness or Malaise.    NECK- Denies Decreased Range of Motion, Stiffness, Thyroid Nodules, Enlarged Lymph Nodes or Goiter.    EYES- Denies Eye Pain, Eye Drainage, Eye Redness, Eye Discharge, Decreased Vision, Visual Disturbance, Diplopia, Visual Loss or Swollen Eyelid.    HENT- Denies  Nasal Discharge, Sore Throat, Ear Pain, Ear Drainage, Sinus Pain, Nasal Congestion, Decreased Hearing or Tinnitus.    PULMONARY- Denies Wheezing, Sputum Production, Cough, Hemoptysis or Pleuritic Chest Pain.    GASTROINTESTINAL- Denies Constipation.    GENITOURINARY- Denies Penile Discharge, Erectile Dysfunction, Testicular Mass, Testicular Pain, Hernia, Nocturia, Decreased Urine Stream, Incomplete Voiding, Urinary Frequency, Urinary Urgency, Dysuria or Hematuria.    CARDIOVASCULAR- Denies Claudication or Irregular Heart Beat.    ENDOCRINE- Denies Cold Intolerance, Depression, Hair Loss, Heat Intolerance, Memory Loss, Sleep Disturbance or Weight Gain.    NEUROLOGIC- Denies Seizures, Confusion or Excessive Daytime Sleepiness.    MUSCULOSKELETAL- Denies Joint Pain, Joint Stiffness, Decreased Range of Motion, Joint Swelling or Erythema of Joints.    INTEGUMENTARY- Denies Rash, Lumps, Sores, Itching, Dryness, Color Change, Changes in Hair, Brittle Nails, Discolored Nails, Thickened Nails, Growths or Alopecia.    Medications      Current Outpatient Medications:   •  aspirin (ASPIRIN LOW DOSE) 81 MG tablet, Take  by mouth daily., Disp: , Rfl:   •  fluticasone (FLONASE) 50 MCG/ACT nasal spray, into each nostril Daily As Needed., Disp: , Rfl:   •  lisinopril (PRINIVIL,ZESTRIL) 40 MG tablet, TAKE 1 TABLET DAILY, Disp: 90 tablet, Rfl: 1  •  simvastatin (ZOCOR) 40 MG tablet, TAKE 1 TABLET DAILY, Disp: 90 tablet, Rfl: 1  •  tadalafil (CIALIS) 5 MG tablet, Take 1 tablet by mouth Daily., Disp: 30 tablet, Rfl: 5     Allergies    No Known Allergies    Problem List    Patient Active Problem List   Diagnosis   • Diabetes mellitus (CMS/[facility])   • Hyperlipidemia   • Hypertension   • Gastroesophageal reflux disease   • Enlarged prostate without lower urinary tract symptoms (luts)       Medications, Allergies, Problems List and Past History were reviewed and updated.    Physical Examination    /82 (BP Location: Left arm, Patient  "Position: Sitting, Cuff Size: Adult)   Pulse 56   Resp 12   Ht 193 cm (76\")   Wt 107 kg (235 lb)   BMI 28.61 kg/m²     HEENT: Head- Normocephalic Atraumatic. Facies- Within normal limits. Pinnas- Normal texture and shape bilaterally. Canals- Normal bilaterally. TMs- Normal bilaterally. Nares- Patent bilaterally. Nasal Septum- is normal. There is no tenderness to palpation over the frontal or maxillary sinuses. Lids- Normal bilaterally. Conjunctiva- Clear bilaterally. Sclera- Anicteric bilaterally. Oropharynx- Moist with no lesions. Tonsils- No enlargement, erythema or exudate.    Neck: Thyroid- non enlarged, symmetric and has no nodules. No bruits are detected. ROM- Normal Range of Motion with no rigidity.    Lungs: Auscultation- Clear to auscultation bilaterally. There are no retractions, clubbing or cyanosis. The Expiratory to Inspiratory ratio is equal.    Lymph Nodes: Cervical- no enlarged lymph nodes noted. Clavicular- Deferred. Axillary- Deferred. Inguinal- Deferred.    Cardiovascular: There are no carotid bruits. Heart- Normal Rate with Regular rhythm and no murmurs. There are no gallops. There are no rubs. In the lower extremities there is no edema. The upper extremities do not have edema.    Abdomen: Soft, benign, non-tender with no masses, hernias, organomegaly or scars.    Male Genitourinary: The penis is uncircumcised with testicles found in the scrotum bilaterally. Testicular Size is normal bilaterally. The penis has no anatomic abnormalities.    Rectal: reveals normal external sphincter tone with no external lesions.    Prostate: The prostate gland is symmetric and smooth with no nodularity.    Feet: The feet are symmetric with normal peter landmarks. There is no tenderness to palpation bilaterally. The feet have normal posterior tibial and dorsalis pedis pulses and normal capillary refill bilaterally. The monofilament examination is normal bilaterally.       The arches are normal bilaterally. " There are no skin or nail lesions present. There are no ingrown nails. There are no bunions noted.    Dermatologic: The patient has no worrisome or suspicious skin lesions noted.    Impression and Assessment    Normal Physical Examination.    Encounter for Screening for Malignant Neoplasm of the Prostate.    Encounter for Immunization Administration.    Type 2 Diabetes Mellitus without complication without insulin usage.    Hyperlipidemia.    Gastroesophageal Reflux Disease.    Essential Hypertension.    Plan    Gastroesophageal Reflux Disease Plan: The condition is stable. No change is needed in the current plan.    Hyperlipidemia Plan: The patient was instructed to exercise daily, eat a low fat diet and continue his medications.    Essential Hypertension Plan: The current plan was continued.    Type 2 Diabetes Mellitus without complication without insulin usage Plan: Further plans will be formulated after test results are reviewed.    Counseling was provided regarding: Adequate Aerobic Exercise, Dental Visits, Flossing Teeth and Heart Healthy Diet.    The following was ordered for screening and health maintenance: PSA.    Counseled regarding immunizations and applicable VIS given.    Immunizations Ordered and Administered: Hepatitis A.    [place] was seen today for annual exam.    Diagnoses and all orders for this visit:    Physical exam    Type 2 diabetes mellitus without complication, without long-term current use of insulin (CMS/[facility])  -     Comprehensive Metabolic Panel  -     Hemoglobin A1c  -     POC Microalbumin    Essential hypertension  -     Comprehensive Metabolic Panel  -     TSH  -     POC Urinalysis Dipstick, Automated    Gastroesophageal reflux disease without esophagitis  -     Comprehensive Metabolic Panel  -     CBC & Differential    Hyperlipidemia, unspecified hyperlipidemia type  -     Comprehensive Metabolic Panel  -     Lipid Panel    Prostate cancer screening  -     PSA Screen    Immunization  due  -     Hepatitis A Vaccine Adult IM          Return to Office    The patient was instructed to return for follow-up in 1 year.    The patient was instructed to return sooner if the condition changes, worsens, or does not resolve.

## 2019-04-17 NOTE — PATIENT INSTRUCTIONS
"Heart-Healthy Eating Plan  Heart-healthy meal planning includes:  · Limiting unhealthy fats.  · Increasing healthy fats.  · Making other small dietary changes.    You may need to talk with your doctor or a diet specialist (dietitian) to create an eating plan that is right for you.  What types of fat should I choose?  · Choose healthy fats. These include olive oil and canola oil, flaxseeds, walnuts, almonds, and seeds.  · Eat more omega-3 fats. These include salmon, mackerel, sardines, tuna, flaxseed oil, and ground flaxseeds. Try to eat fish at least twice each week.  · Limit saturated fats.  ? Saturated fats are often found in animal products, such as meats, butter, and cream.  ? Plant sources of saturated fats include palm oil, palm kernel oil, and coconut oil.  · Avoid foods with partially hydrogenated oils in them. These include stick margarine, some tub margarines, cookies, crackers, and other baked goods. These contain trans fats.  What general guidelines do I need to follow?  · Check food labels carefully. Identify foods with trans fats or high amounts of saturated fat.  · Fill one half of your plate with vegetables and green salads. Eat 4-5 servings of vegetables per day. A serving of vegetables is:  ? 1 cup of raw leafy vegetables.  ? ½ cup of raw or cooked cut-up vegetables.  ? ½ cup of vegetable juice.  · Fill one fourth of your plate with whole grains. Look for the word \"whole\" as the first word in the ingredient list.  · Fill one fourth of your plate with lean protein foods.  · Eat 4-5 servings of fruit per day. A serving of fruit is:  ? One medium whole fruit.  ? ¼ cup of dried fruit.  ? ½ cup of fresh, frozen, or canned fruit.  ? ½ cup of 100% fruit juice.  · Eat more foods that contain soluble fiber. These include apples, broccoli, carrots, beans, peas, and barley. Try to get 20-30 g of fiber per day.  · Eat more home-cooked food. Eat less restaurant, buffet, and fast food.  · Limit or avoid " alcohol.  · Limit foods high in starch and sugar.  · Avoid fried foods.  · Avoid frying your food. Try baking, boiling, grilling, or broiling it instead. You can also reduce fat by:  ? Removing the skin from poultry.  ? Removing all visible fats from meats.  ? Skimming the fat off of stews, soups, and gravies before serving them.  ? Steaming vegetables in water or broth.  · Lose weight if you are overweight.  · Eat 4-5 servings of nuts, legumes, and seeds per week:  ? One serving of dried beans or legumes equals ½ cup after being cooked.  ? One serving of nuts equals 1½ ounces.  ? One serving of seeds equals ½ ounce or one tablespoon.  · You may need to keep track of how much salt or sodium you eat. This is especially true if you have high blood pressure. Talk with your doctor or dietitian to get more information.  What foods can I eat?  Grains  Breads, including Guamanian, white, hailey, wheat, raisin, rye, oatmeal, and Italian. Tortillas that are neither fried nor made with lard or trans fat. Low-fat rolls, including hotdog and hamburger buns and English muffins. Biscuits. Muffins. Waffles. Pancakes. Light popcorn. Whole-grain cereals. Flatbread. Ashby toast. Pretzels. Breadsticks. Rusks. Low-fat snacks. Low-fat crackers, including oyster, saltine, matzo, paula, animal, and rye. Rice and pasta, including brown rice and pastas that are made with whole wheat.  Vegetables  All vegetables.  Fruits  All fruits, but limit coconut.  Meats and Other Protein Sources  Lean, well-trimmed beef, veal, pork, and lamb. Chicken and turkey without skin. All fish and shellfish. Wild duck, rabbit, pheasant, and venison. Egg whites or low-cholesterol egg substitutes. Dried beans, peas, lentils, and tofu. Seeds and most nuts.  Dairy  Low-fat or nonfat cheeses, including ricotta, string, and mozzarella. Skim or 1% milk that is liquid, powdered, or evaporated. Buttermilk that is made with low-fat milk. Nonfat or low-fat  yogurt.  Beverages  Mineral water. Diet carbonated beverages.  Sweets and Desserts  Sherbets and fruit ices. Honey, jam, marmalade, jelly, and syrups. Meringues and gelatins. Pure sugar candy, such as hard candy, jelly beans, gumdrops, mints, marshmallows, and small amounts of dark chocolate. Joe food cake.  Eat all sweets and desserts in moderation.  Fats and Oils  Nonhydrogenated (trans-free) margarines. Vegetable oils, including soybean, sesame, sunflower, olive, peanut, safflower, corn, canola, and cottonseed. Salad dressings or mayonnaise made with a vegetable oil. Limit added fats and oils that you use for cooking, baking, salads, and as spreads.  Other  Cocoa powder. Coffee and tea. All seasonings and condiments.  The items listed above may not be a complete list of recommended foods or beverages. Contact your dietitian for more options.  What foods are not recommended?  Grains  Breads that are made with saturated or trans fats, oils, or whole milk. Croissants. Butter rolls. Cheese breads. Sweet rolls. Donuts. Buttered popcorn. Chow mein noodles. High-fat crackers, such as cheese or butter crackers.  Meats and Other Protein Sources  Fatty meats, such as hotdogs, short ribs, sausage, spareribs, jeong, rib eye roast or steak, and mutton. High-fat deli meats, such as salami and bologna. Caviar. Domestic duck and goose. Organ meats, such as kidney, liver, sweetbreads, and heart.  Dairy  Cream, sour cream, cream cheese, and creamed cottage cheese. Whole-milk cheeses, including blue (cary), [place]ony True, Brie, Omar, American, Havarti, Swiss, cheddar, Camembert, and [place]. Whole or 2% milk that is liquid, evaporated, or condensed. Whole buttermilk. Cream sauce or high-fat cheese sauce. Yogurt that is made from whole milk.  Beverages  Regular sodas and juice drinks with added sugar.  Sweets and Desserts  Frosting. Pudding. Cookies. Cakes other than joe food cake. Candy that has milk chocolate or white  chocolate, hydrogenated fat, butter, coconut, or unknown ingredients. Buttered syrups. Full-fat ice cream or ice cream drinks.  Fats and Oils  Gravy that has suet, meat fat, or shortening. Cocoa butter, hydrogenated oils, palm oil, coconut oil, palm kernel oil. These can often be found in baked products, candy, fried foods, nondairy creamers, and whipped toppings. Solid fats and shortenings, including jeong fat, salt pork, lard, and butter. Nondairy cream substitutes, such as coffee creamers and sour cream substitutes. Salad dressings that are made of unknown oils, cheese, or sour cream.  The items listed above may not be a complete list of foods and beverages to avoid. Contact your dietitian for more information.  This information is not intended to replace advice given to you by your health care provider. Make sure you discuss any questions you have with your health care provider.  Document Released: 06/18/2013 Document Revised: 05/25/2017 Document Reviewed: 06/11/2015  [employer] Interactive Patient Education © 2019 [employer]

## 2019-04-19 ENCOUNTER — TELEPHONE (OUTPATIENT)
Dept: INTERNAL MEDICINE | Facility: CLINIC | Age: 59
End: 2019-04-19

## 2019-04-19 RX ORDER — TADALAFIL 5 MG/1
5 TABLET ORAL DAILY
Qty: 30 TABLET | Refills: 5 | Status: SHIPPED | OUTPATIENT
Start: 2019-04-19 | End: 2020-04-21

## 2019-04-19 NOTE — TELEPHONE ENCOUNTER
----- Message from Olesya Manriquez sent at 4/19/2019  1:04 PM EDT -----  Contact: SELF  SHANICE TYLER CALLING FOR A PA FOR KVNG. HE USES THE [employer] [facility] [employer]. HE CAN BE REACHED -[phone]

## 2019-04-22 ENCOUNTER — PRIOR AUTHORIZATION (OUTPATIENT)
Dept: INTERNAL MEDICINE | Facility: CLINIC | Age: 59
End: 2019-04-22

## 2019-04-22 NOTE — TELEPHONE ENCOUNTER
Orlando Meraz (Key: [id number])   PA request for Tadalafil 5mg 30 tablet for 30 days  Outcome:The demographic data for this request is being submitted to Danielle.    Danielle is processing your PA request and will respond shortly with next steps. You may close this dialog, return to your dashboard, and perform other tasks. To check for an update later, open this request again from your dashboard.  If you need assistance, please chat with CoverMyMeds or call us at [phone].

## 2019-04-26 ENCOUNTER — TELEPHONE (OUTPATIENT)
Dept: INTERNAL MEDICINE | Facility: CLINIC | Age: 59
End: 2019-04-26

## 2019-04-26 NOTE — TELEPHONE ENCOUNTER
----- Message from Yuliana Riggins sent at 4/26/2019  2:50 PM EDT -----  Patient called to get an update on PA for Cialis. States he hasn't heard anything back from us. Call patient at [phone] with a status update.

## 2019-04-26 NOTE — TELEPHONE ENCOUNTER
[facility] [phone]  Spoke to pharmacist advised covermymeds states  N/A on April 4   No Prior Authorization is required at this time based on the alternative drug you chose to prescribe.;CaseId:[id number];Status:Cancelled;    Pharmacist confirmed medication is still being denied due to requires a PA. Advised to contact insurance for confirmation, [phone].     [employer] [phone]  Violette  Spoke to  provided them with pt's member ID :  [id number]  And   [id number]  Violette advised not able to locate the pt in there system, advised to have pt contact the Insurance member services phone number to confirm benefits, [phone].     Orlando Meraz [phone]  Spoke to pt advised of clinical message. Confirmed insurance not able to locate the pt, advised to contact insurance and verify benefits. Good verbal understanding.    Pt will return phone call.

## 2019-04-29 NOTE — TELEPHONE ENCOUNTER
Orlando Meraz (Key: [id number])  This request has been approved.Please note any additional information provided by [facility] at the bottom of your screen.

## 2019-04-29 NOTE — TELEPHONE ENCOUNTER
PER chart, I submitted the PA via [employer], just checked this morning and it is still pending review for insurance. That was submitted for [employer] [facility] on 04/22/2019.     Orlando Meraz (Key: [id number])     Danielle is processing your PA request and will respond shortly with next steps. You may close this dialog, return to your dashboard, and perform other tasks. To check for an update later, open this request again from your dashboard.  If you need assistance, please chat with "[employer]" or call us at [phone].    May need to F/U with insurance.

## 2019-04-29 NOTE — TELEPHONE ENCOUNTER
Do you know if I need to contact pharmacy and see if they have pt as [facility] or if I need to resend PA thru [employer]? Thank you.

## 2019-04-30 NOTE — TELEPHONE ENCOUNTER
Pt returned call and informed that PA was approved by his insurance.  Verb understanding and apprec.

## 2019-09-09 RX ORDER — SIMVASTATIN 40 MG
TABLET ORAL
Qty: 90 TABLET | Refills: 1 | Status: SHIPPED | OUTPATIENT
Start: 2019-09-09 | End: 2020-03-09

## 2019-09-09 RX ORDER — LISINOPRIL 40 MG/1
TABLET ORAL
Qty: 90 TABLET | Refills: 1 | Status: SHIPPED | OUTPATIENT
Start: 2019-09-09 | End: 2020-03-09

## 2019-11-07 ENCOUNTER — OFFICE VISIT (OUTPATIENT)
Dept: INTERNAL MEDICINE | Facility: CLINIC | Age: 59
End: 2019-11-07

## 2019-11-07 VITALS
RESPIRATION RATE: 16 BRPM | HEART RATE: 56 BPM | DIASTOLIC BLOOD PRESSURE: 78 MMHG | SYSTOLIC BLOOD PRESSURE: 128 MMHG | BODY MASS INDEX: 27.87 KG/M2 | WEIGHT: 229 LBS | TEMPERATURE: 98.1 F

## 2019-11-07 DIAGNOSIS — K21.9 GASTROESOPHAGEAL REFLUX DISEASE WITHOUT ESOPHAGITIS: ICD-10-CM

## 2019-11-07 DIAGNOSIS — I10 ESSENTIAL HYPERTENSION: ICD-10-CM

## 2019-11-07 DIAGNOSIS — Z23 IMMUNIZATION DUE: ICD-10-CM

## 2019-11-07 DIAGNOSIS — E78.5 HYPERLIPIDEMIA, UNSPECIFIED HYPERLIPIDEMIA TYPE: ICD-10-CM

## 2019-11-07 DIAGNOSIS — E11.9 TYPE 2 DIABETES MELLITUS WITHOUT COMPLICATION, WITHOUT LONG-TERM CURRENT USE OF INSULIN (HCC): Primary | ICD-10-CM

## 2019-11-07 LAB
ALBUMIN SERPL-MCNC: 4.5 G/DL (ref 3.5–5.2)
ALBUMIN/GLOB SERPL: 1.5 G/DL
ALP SERPL-CCNC: 70 U/L (ref 39–117)
ALT SERPL W P-5'-P-CCNC: 19 U/L (ref 1–41)
ANION GAP SERPL CALCULATED.3IONS-SCNC: 11.7 MMOL/L (ref 5–15)
AST SERPL-CCNC: 25 U/L (ref 1–40)
BILIRUB SERPL-MCNC: 0.9 MG/DL (ref 0.2–1.2)
BUN BLD-MCNC: 13 MG/DL (ref 8–23)
BUN/CREAT SERPL: 14.8 (ref 7–25)
CALCIUM SPEC-SCNC: 9.5 MG/DL (ref 8.6–10.5)
CHLORIDE SERPL-SCNC: 102 MMOL/L (ref 98–107)
CHOLEST SERPL-MCNC: 145 MG/DL (ref 0–200)
CO2 SERPL-SCNC: 27.3 MMOL/L (ref 22–29)
CREAT BLD-MCNC: 0.88 MG/DL (ref 0.76–1.27)
GFR SERPL CREATININE-BSD FRML MDRD: 88 ML/MIN/1.73
GLOBULIN UR ELPH-MCNC: 3 GM/DL
GLUCOSE BLD-MCNC: 107 MG/DL (ref 65–99)
HBA1C MFR BLD: 6.3 % (ref 4.8–5.6)
HDLC SERPL-MCNC: 44 MG/DL (ref 40–60)
LDLC SERPL CALC-MCNC: 75 MG/DL (ref 0–100)
LDLC/HDLC SERPL: 1.7 {RATIO}
POTASSIUM BLD-SCNC: 4.6 MMOL/L (ref 3.5–5.2)
PROT SERPL-MCNC: 7.5 G/DL (ref 6–8.5)
SODIUM BLD-SCNC: 141 MMOL/L (ref 136–145)
TRIGL SERPL-MCNC: 131 MG/DL (ref 0–150)
VLDLC SERPL-MCNC: 26.2 MG/DL (ref 5–40)

## 2019-11-07 PROCEDURE — 99214 OFFICE O/P EST MOD 30 MIN: CPT | Performed by: INTERNAL MEDICINE

## 2019-11-07 PROCEDURE — 83036 HEMOGLOBIN GLYCOSYLATED A1C: CPT | Performed by: INTERNAL MEDICINE

## 2019-11-07 PROCEDURE — 90686 IIV4 VACC NO PRSV 0.5 ML IM: CPT | Performed by: INTERNAL MEDICINE

## 2019-11-07 PROCEDURE — 90471 IMMUNIZATION ADMIN: CPT | Performed by: INTERNAL MEDICINE

## 2019-11-07 PROCEDURE — 36415 COLL VENOUS BLD VENIPUNCTURE: CPT | Performed by: INTERNAL MEDICINE

## 2019-11-07 PROCEDURE — 80053 COMPREHEN METABOLIC PANEL: CPT | Performed by: INTERNAL MEDICINE

## 2019-11-07 PROCEDURE — 80061 LIPID PANEL: CPT | Performed by: INTERNAL MEDICINE

## 2019-11-07 NOTE — PROGRESS NOTES
Chief Complaint   Patient presents with   • Follow-up     6 month follow up chronic medical problems       History of Present Illness      The patient presents for a follow-up related to Type 2 Diabetes Mellitus and reports that he doesn't check his blood sugars at home. He denies hypoglycemic symptoms. The patient denies polyuria, polydypsia or polyphagia. He reports no symptoms of neuropathy. The patient is not on medication for his Type 2 Diabetes Mellitus. The patient does check his feet daily at home. He denies chest pain, shortness of breath, orthopnea, paroxysmal nocturnal dyspnea, dyspnea on exertion, edema, palpitations or syncope.    The patient presents for a follow-up related to hypertension. The patient reports that he has had no headaches or blurred vision. He states that he is taking his medication as prescribed. He is not having medication side effects.    The patient presents for a follow-up related to GERD. The patient is not on medication for his gastroesophageal reflux. He reports no abdominal pain, belching, diarrhea, dysphagia, early satiety, heartburn, hoarseness, nausea, odynophagia, rectal bleeding, vomiting or weight loss. The GERD has no known aggravating factors.    The patient presents for a follow-up related to hyperlipidemia. He is following a low fat diet. He reports that he is exercising. He is taking simvastatin. The patient is taking his medication as prescribed. He reports no medication side effects, including muscle cramps, abdominal pain, headaches or weakness.    Review of Systems    CONSTITUTIONAL- Denies Fever, Chills, Sweats, Fatigue, Weakness or Malaise.    PULMONARY- Denies Wheezing, Sputum Production, Cough, Hemoptysis or Pleuritic Chest Pain.    CARDIOVASCULAR- Denies Claudication or Irregular Heart Beat.    Medications      Current Outpatient Medications:   •  aspirin (ASPIRIN LOW DOSE) 81 MG tablet, Take  by mouth daily., Disp: , Rfl:   •  fluticasone (FLONASE) 50  MCG/ACT nasal spray, into each nostril Daily As Needed., Disp: , Rfl:   •  lisinopril (PRINIVIL,ZESTRIL) 40 MG tablet, TAKE 1 TABLET DAILY, Disp: 90 tablet, Rfl: 1  •  simvastatin (ZOCOR) 40 MG tablet, TAKE 1 TABLET DAILY, Disp: 90 tablet, Rfl: 1  •  tadalafil (CIALIS) 5 MG tablet, Take 1 tablet by mouth Daily., Disp: 30 tablet, Rfl: 5     Allergies    No Known Allergies    Problem List    Patient Active Problem List   Diagnosis   • Diabetes mellitus (CMS/HCC)   • Hyperlipidemia   • Hypertension   • Gastroesophageal reflux disease   • Enlarged prostate without lower urinary tract symptoms (luts)       Medications, Allergies, Problems List and Past History were reviewed and updated.    Physical Examination    /78   Pulse 56   Temp 98.1 °F (36.7 °C) (Temporal)   Resp 16   Wt 104 kg (229 lb)   BMI 27.87 kg/m²     Neck: Thyroid- non enlarged, symmetric and has no nodules. No bruits are detected.    Lungs: Auscultation- Clear to auscultation bilaterally. There are no retractions, clubbing or cyanosis. The Expiratory to Inspiratory ratio is equal.    Cardiovascular: There are no carotid bruits. Heart- Normal Rate with Regular rhythm and no murmurs. There are no gallops. There are no rubs. In the lower extremities there is no edema. The upper extremities do not have edema.    Abdomen: Soft, benign, non-tender with no masses, hernias, organomegaly or scars.    Impression and Assessment    Type 2 Diabetes Mellitus without complication without insulin usage.    Essential Hypertension.    Gastroesophageal Reflux Disease.    Hyperlipidemia.    Plan    Gastroesophageal Reflux Disease Plan: The current plan was continued.    Essential Hypertension Plan: The current plan was continued.    Hyperlipidemia Plan: The patient was instructed to exercise daily, eat a low fat diet and continue his medications.    Type 2 Diabetes Mellitus without complication without insulin usage Plan: The current plan was continued.    [place]  was seen today for follow-up.    Diagnoses and all orders for this visit:    Type 2 diabetes mellitus without complication, without long-term current use of insulin (CMS/[facility])  -     Comprehensive Metabolic Panel  -     Hemoglobin A1c    Essential hypertension  -     Comprehensive Metabolic Panel    Gastroesophageal reflux disease without esophagitis    Hyperlipidemia, unspecified hyperlipidemia type  -     Comprehensive Metabolic Panel  -     Lipid Panel    Immunization due  -     Fluarix/Fluzone/Afluria Quad>6 Months        Return to Office    The patient was instructed to return for follow-up in 6 months. Next Visit: Physical.    The patient was instructed to return sooner if the condition changes, worsens, or does not resolve.

## 2020-02-24 ENCOUNTER — OFFICE VISIT (OUTPATIENT)
Dept: INTERNAL MEDICINE | Facility: CLINIC | Age: 60
End: 2020-02-24

## 2020-02-24 VITALS
TEMPERATURE: 98.2 F | BODY MASS INDEX: 28.49 KG/M2 | OXYGEN SATURATION: 97 % | RESPIRATION RATE: 16 BRPM | WEIGHT: 234 LBS | HEIGHT: 76 IN | SYSTOLIC BLOOD PRESSURE: 130 MMHG | DIASTOLIC BLOOD PRESSURE: 80 MMHG | HEART RATE: 64 BPM

## 2020-02-24 DIAGNOSIS — J20.9 ACUTE BRONCHITIS, UNSPECIFIED ORGANISM: Primary | ICD-10-CM

## 2020-02-24 DIAGNOSIS — R05.9 COUGH: ICD-10-CM

## 2020-02-24 PROCEDURE — 87070 CULTURE OTHR SPECIMN AEROBIC: CPT | Performed by: INTERNAL MEDICINE

## 2020-02-24 PROCEDURE — 99214 OFFICE O/P EST MOD 30 MIN: CPT | Performed by: INTERNAL MEDICINE

## 2020-02-24 RX ORDER — AZITHROMYCIN 250 MG/1
TABLET, FILM COATED ORAL
Qty: 6 TABLET | Refills: 0 | Status: SHIPPED | OUTPATIENT
Start: 2020-02-24 | End: 2020-06-22

## 2020-02-24 RX ORDER — METHYLPREDNISOLONE 4 MG/1
TABLET ORAL
Qty: 1 EACH | Refills: 0 | Status: SHIPPED | OUTPATIENT
Start: 2020-02-24 | End: 2020-06-22

## 2020-02-24 NOTE — PATIENT INSTRUCTIONS
Recommend Mucinex for the cough and congestion, and may continue the Tessalon Perles, and plenty of fluids.

## 2020-02-24 NOTE — PROGRESS NOTES
Subjective       Orlando Meraz is a 59 y.o. male.     Chief Complaint   Patient presents with   • URI     Cough, congestion, drainage - 4 weeks, given abx at [facility] in [facility] (02/13/2020)    • Ear Fullness     Fluid behind his ears        History obtained from the patient.      URI    This is a new problem. Episode onset: 4 weeks ago. The problem has been gradually improving. There has been no fever. Associated symptoms include congestion (nasal and chest), coughing (wet, hacking, productive of yellow green sputum ), ear pain (sore, resolved), headaches, a plugged ear sensation (resolved), sneezing and a sore throat (scratchy). Pertinent negatives include no abdominal pain, chest pain, diarrhea, joint pain, joint swelling, nausea, neck pain, rash, rhinorrhea, sinus pain, swollen glands, vomiting or wheezing. Treatments tried: Flonase. The treatment provided mild relief.   Ear Fullness    There is pain in the right ear. This is a new problem. Episode onset: 2/13/20- Seen [facility] in [facility], ear symptoms started a few days earlier. The problem has been resolved. There has been no fever. Associated symptoms include coughing (wet, hacking, productive of yellow green sputum ), headaches and a sore throat (scratchy). Pertinent negatives include no abdominal pain, diarrhea, ear discharge, hearing loss, neck pain, rash, rhinorrhea or vomiting. He has tried antibiotics (and Tessalon) for the symptoms. The treatment provided significant relief. There is no history of a chronic ear infection or hearing loss.      Of note, the patient has never smoked.  Last Tdap was in 2010.    The following portions of the patient's history were reviewed and updated as appropriate: allergies, current medications, past family history, past medical history, past social history, past surgical history and problem list.      Review of Systems   Constitutional: Positive for chills. Negative for fatigue and fever.   HENT: Positive for congestion  "(nasal and chest), ear pain (sore, resolved), postnasal drip, sneezing and sore throat (scratchy). Negative for ear discharge, hearing loss, rhinorrhea, sinus pressure and sinus pain.    Eyes: Negative for pain, discharge, redness and itching.   Respiratory: Positive for cough (wet, hacking, productive of yellow green sputum ). Negative for chest tightness, shortness of breath and wheezing.    Cardiovascular: Negative for chest pain.   Gastrointestinal: Negative for abdominal pain, diarrhea, nausea and vomiting.   Musculoskeletal: Negative for arthralgias, joint pain, myalgias, neck pain and neck stiffness.   Skin: Negative for rash.   Neurological: Positive for headaches.   Hematological: Negative for adenopathy.           Objective     Blood pressure 130/80, pulse 64, temperature 98.2 °F (36.8 °C), temperature source Temporal, resp. rate 16, height 193 cm (76\"), weight 106 kg (234 lb), SpO2 97 %.    Physical Exam   Constitutional:   Overweight.   HENT:   Head: Normocephalic and atraumatic.   Right Ear: Tympanic membrane, external ear and ear canal normal.   Left Ear: Tympanic membrane, external ear and ear canal normal.   Mouth/Throat: Oropharynx is clear and moist and mucous membranes are normal. No oral lesions.   Tonsils normal.  No sinus tenderness to palpation.   Eyes: Conjunctivae are normal.   Neck: Normal range of motion. Neck supple.   Cardiovascular: Normal rate and regular rhythm.   No murmur heard.  Pulmonary/Chest: Effort normal and breath sounds normal.   Lymphadenopathy:     He has no cervical adenopathy.   Skin: No rash noted.   Psychiatric: He has a normal mood and affect.   Nursing note and vitals reviewed.        Assessment/Plan   Orlando was seen today for uri and ear fullness.    Diagnoses and all orders for this visit:    Acute bronchitis, unspecified organism  -     methylPREDNISolone (MEDROL, GEOFFREY,) 4 MG tablet; Take as directed on package instructions.  -     azithromycin (ZITHROMAX Z-GEOFFREY) " 250 MG tablet; Take 2 tablets the first day, then 1 tablet daily for 4 days.    Continue the Tessalon Perles.    Cough  -     B Pertussis Culture - Swab, Nasopharynx   Recommend Mucinex for the cough and congestion, and plenty of fluids.      Return if symptoms worsen or fail to improve.

## 2020-03-09 RX ORDER — LISINOPRIL 40 MG/1
TABLET ORAL
Qty: 90 TABLET | Refills: 1 | Status: SHIPPED | OUTPATIENT
Start: 2020-03-09 | End: 2020-09-23

## 2020-03-09 RX ORDER — SIMVASTATIN 40 MG
TABLET ORAL
Qty: 90 TABLET | Refills: 1 | Status: SHIPPED | OUTPATIENT
Start: 2020-03-09 | End: 2020-09-23

## 2020-03-10 LAB — BACTERIA SPEC AEROBE CULT: NEGATIVE

## 2020-04-08 RX ORDER — BENZONATATE 200 MG/1
200 CAPSULE ORAL 3 TIMES DAILY PRN
Qty: 30 CAPSULE | Refills: 0 | Status: SHIPPED | OUTPATIENT
Start: 2020-04-08 | End: 2020-06-22

## 2020-04-08 RX ORDER — LEVOFLOXACIN 500 MG/1
500 TABLET, FILM COATED ORAL DAILY
Qty: 10 TABLET | Refills: 0 | Status: SHIPPED | OUTPATIENT
Start: 2020-04-08 | End: 2020-06-22

## 2020-04-21 RX ORDER — TADALAFIL 5 MG/1
TABLET ORAL
Qty: 6 TABLET | Refills: 4 | Status: SHIPPED | OUTPATIENT
Start: 2020-04-21 | End: 2020-11-02

## 2020-06-22 ENCOUNTER — OFFICE VISIT (OUTPATIENT)
Dept: INTERNAL MEDICINE | Facility: CLINIC | Age: 60
End: 2020-06-22

## 2020-06-22 VITALS
SYSTOLIC BLOOD PRESSURE: 128 MMHG | HEIGHT: 76 IN | RESPIRATION RATE: 16 BRPM | WEIGHT: 231 LBS | BODY MASS INDEX: 28.13 KG/M2 | TEMPERATURE: 97.1 F | DIASTOLIC BLOOD PRESSURE: 80 MMHG | HEART RATE: 64 BPM

## 2020-06-22 DIAGNOSIS — K21.9 GASTROESOPHAGEAL REFLUX DISEASE WITHOUT ESOPHAGITIS: ICD-10-CM

## 2020-06-22 DIAGNOSIS — I10 ESSENTIAL HYPERTENSION: ICD-10-CM

## 2020-06-22 DIAGNOSIS — Z12.11 SCREENING FOR COLON CANCER: ICD-10-CM

## 2020-06-22 DIAGNOSIS — Z12.5 PROSTATE CANCER SCREENING: ICD-10-CM

## 2020-06-22 DIAGNOSIS — Z00.00 PHYSICAL EXAM: ICD-10-CM

## 2020-06-22 DIAGNOSIS — E78.5 HYPERLIPIDEMIA, UNSPECIFIED HYPERLIPIDEMIA TYPE: ICD-10-CM

## 2020-06-22 DIAGNOSIS — E11.9 TYPE 2 DIABETES MELLITUS WITHOUT COMPLICATION, WITHOUT LONG-TERM CURRENT USE OF INSULIN (HCC): Primary | ICD-10-CM

## 2020-06-22 LAB
ALBUMIN SERPL-MCNC: 5 G/DL (ref 3.5–5.2)
ALBUMIN/GLOB SERPL: 2.4 G/DL
ALP SERPL-CCNC: 70 U/L (ref 39–117)
ALT SERPL W P-5'-P-CCNC: 37 U/L (ref 1–41)
ANION GAP SERPL CALCULATED.3IONS-SCNC: 11.4 MMOL/L (ref 5–15)
AST SERPL-CCNC: 31 U/L (ref 1–40)
BASOPHILS # BLD AUTO: 0.06 10*3/MM3 (ref 0–0.2)
BASOPHILS NFR BLD AUTO: 0.9 % (ref 0–1.5)
BILIRUB BLD-MCNC: ABNORMAL MG/DL
BILIRUB SERPL-MCNC: 0.7 MG/DL (ref 0.2–1.2)
BUN BLD-MCNC: 15 MG/DL (ref 8–23)
BUN/CREAT SERPL: 14.7 (ref 7–25)
CALCIUM SPEC-SCNC: 9.4 MG/DL (ref 8.6–10.5)
CHLORIDE SERPL-SCNC: 105 MMOL/L (ref 98–107)
CHOLEST SERPL-MCNC: 153 MG/DL (ref 0–200)
CLARITY, POC: CLEAR
CO2 SERPL-SCNC: 23.6 MMOL/L (ref 22–29)
COLOR UR: YELLOW
CREAT BLD-MCNC: 1.02 MG/DL (ref 0.76–1.27)
DEPRECATED RDW RBC AUTO: 43 FL (ref 37–54)
EOSINOPHIL # BLD AUTO: 0.16 10*3/MM3 (ref 0–0.4)
EOSINOPHIL NFR BLD AUTO: 2.4 % (ref 0.3–6.2)
ERYTHROCYTE [DISTWIDTH] IN BLOOD BY AUTOMATED COUNT: 12.6 % (ref 12.3–15.4)
EXPIRATION DATE: ABNORMAL
GFR SERPL CREATININE-BSD FRML MDRD: 74 ML/MIN/1.73
GLOBULIN UR ELPH-MCNC: 2.1 GM/DL
GLUCOSE BLD-MCNC: 148 MG/DL (ref 65–99)
GLUCOSE UR STRIP-MCNC: NEGATIVE MG/DL
HCT VFR BLD AUTO: 45.8 % (ref 37.5–51)
HDLC SERPL-MCNC: 45 MG/DL (ref 40–60)
HGB BLD-MCNC: 15.3 G/DL (ref 13–17.7)
IMM GRANULOCYTES # BLD AUTO: 0.02 10*3/MM3 (ref 0–0.05)
IMM GRANULOCYTES NFR BLD AUTO: 0.3 % (ref 0–0.5)
KETONES UR QL: NEGATIVE
LDLC SERPL CALC-MCNC: 88 MG/DL (ref 0–100)
LDLC/HDLC SERPL: 1.95 {RATIO}
LEUKOCYTE EST, POC: NEGATIVE
LYMPHOCYTES # BLD AUTO: 1.92 10*3/MM3 (ref 0.7–3.1)
LYMPHOCYTES NFR BLD AUTO: 28.5 % (ref 19.6–45.3)
Lab: ABNORMAL
MCH RBC QN AUTO: 31 PG (ref 26.6–33)
MCHC RBC AUTO-ENTMCNC: 33.4 G/DL (ref 31.5–35.7)
MCV RBC AUTO: 92.7 FL (ref 79–97)
MONOCYTES # BLD AUTO: 0.71 10*3/MM3 (ref 0.1–0.9)
MONOCYTES NFR BLD AUTO: 10.5 % (ref 5–12)
NEUTROPHILS # BLD AUTO: 3.87 10*3/MM3 (ref 1.7–7)
NEUTROPHILS NFR BLD AUTO: 57.4 % (ref 42.7–76)
NITRITE UR-MCNC: NEGATIVE MG/ML
NRBC BLD AUTO-RTO: 0 /100 WBC (ref 0–0.2)
PH UR: 5 [PH] (ref 5–8)
PLATELET # BLD AUTO: 199 10*3/MM3 (ref 140–450)
PMV BLD AUTO: 11.9 FL (ref 6–12)
POTASSIUM BLD-SCNC: 4.3 MMOL/L (ref 3.5–5.2)
PROT SERPL-MCNC: 7.1 G/DL (ref 6–8.5)
PROT UR STRIP-MCNC: ABNORMAL MG/DL
PSA SERPL-MCNC: 0.4 NG/ML (ref 0–4)
RBC # BLD AUTO: 4.94 10*6/MM3 (ref 4.14–5.8)
RBC # UR STRIP: NEGATIVE /UL
SODIUM BLD-SCNC: 140 MMOL/L (ref 136–145)
SP GR UR: 1.03 (ref 1–1.03)
TRIGL SERPL-MCNC: 101 MG/DL (ref 0–150)
TSH SERPL DL<=0.05 MIU/L-ACNC: 0.85 UIU/ML (ref 0.27–4.2)
UROBILINOGEN UR QL: ABNORMAL
VLDLC SERPL-MCNC: 20.2 MG/DL (ref 5–40)
WBC NRBC COR # BLD: 6.74 10*3/MM3 (ref 3.4–10.8)

## 2020-06-22 PROCEDURE — 99396 PREV VISIT EST AGE 40-64: CPT | Performed by: INTERNAL MEDICINE

## 2020-06-22 PROCEDURE — G0103 PSA SCREENING: HCPCS | Performed by: INTERNAL MEDICINE

## 2020-06-22 PROCEDURE — 81003 URINALYSIS AUTO W/O SCOPE: CPT | Performed by: INTERNAL MEDICINE

## 2020-06-22 PROCEDURE — 80053 COMPREHEN METABOLIC PANEL: CPT | Performed by: INTERNAL MEDICINE

## 2020-06-22 PROCEDURE — 85025 COMPLETE CBC W/AUTO DIFF WBC: CPT | Performed by: INTERNAL MEDICINE

## 2020-06-22 PROCEDURE — 36415 COLL VENOUS BLD VENIPUNCTURE: CPT | Performed by: INTERNAL MEDICINE

## 2020-06-22 PROCEDURE — 84443 ASSAY THYROID STIM HORMONE: CPT | Performed by: INTERNAL MEDICINE

## 2020-06-22 PROCEDURE — 80061 LIPID PANEL: CPT | Performed by: INTERNAL MEDICINE

## 2020-06-22 NOTE — PROGRESS NOTES
Chief Complaint   Patient presents with   • Annual Exam       History of Present Illness    The patient presents for an established patient physical examination and health maintenance visit.    The patient presents for a follow-up related to Type 2 Diabetes Mellitus. He does not check his blood sugars at home. He denies hypoglycemic symptoms. He reports no symptoms of neuropathy. The patient is not on medication for his Type 2 Diabetes Mellitus. The patient does check his feet daily at home. He denies orthopnea, paroxysmal nocturnal dyspnea or dyspnea on exertion.    The patient presents for a follow-up related to hypertension. He states that he is taking his medication as prescribed. He is not having medication side effects.    The patient presents for a follow-up related to GERD. The patient is not on medication for his gastroesophageal reflux. He reports no belching, dysphagia, early satiety, hoarseness or odynophagia. The GERD has no known aggravating factors.    The patient presents for a follow-up related to hyperlipidemia. He is following a low fat diet. He reports that he is exercising. He is taking simvastatin. The patient is taking his medication as prescribed. He reports no medication side effects, including muscle cramps, abdominal pain, headaches or weakness.    Review of Systems    CONSTITUTIONAL- Denies Unexplained Weight Loss, Fever, Chills, Sweats, Fatigue, Weakness or Malaise.    NECK- Denies Decreased Range of Motion, Stiffness, Thyroid Nodules, Enlarged Lymph Nodes or Goiter.    EYES- Denies Eye Pain, Eye Drainage, Eye Redness, Eye Discharge, Decreased Vision, Visual Disturbance, Diplopia, Visual Loss or Swollen Eyelid.    HENT- Denies Nasal Discharge, Sore Throat, Ear Pain, Ear Drainage, Headache, Sinus Pain, Nasal Congestion, Decreased Hearing or Tinnitus.    PULMONARY- Denies Wheezing, Dyspnea, Sputum Production, Cough, Hemoptysis or Pleuritic Chest Pain.    GASTROINTESTINAL- Denies Abdominal  Pain, Nausea, Vomiting, Diarrhea, Blood per Rectum, Constipation or Heartburn.    GENITOURINARY- Denies Penile Discharge, Erectile Dysfunction, Testicular Mass, Testicular Pain, Hernia, Nocturia, Decreased Urine Stream, Incomplete Voiding, Urinary Frequency, Urinary Urgency, Dysuria or Hematuria.    CARDIOVASCULAR- Denies Chest Pain, Claudication, Edema, Syncope, Palpitations or Irregular Heart Beat.    ENDOCRINE- Denies Cold Intolerance, Depression, Hair Loss, Heat Intolerance, Memory Loss, Polydypsia, Polyphagia, Polyuria, Sleep Disturbance or Weight Gain.    NEUROLOGIC- Denies Seizures, Visual Changes, Confusion or Excessive Daytime Sleepiness.    MUSCULOSKELETAL- Denies Joint Pain, Joint Stiffness, Decreased Range of Motion, Joint Swelling or Erythema of Joints.    INTEGUMENTARY- Denies Rash, Lumps, Sores, Itching, Dryness, Color Change, Changes in Hair, Brittle Nails, Discolored Nails, Thickened Nails, Growths or Alopecia.    Medications      Current Outpatient Medications:   •  aspirin (ASPIRIN LOW DOSE) 81 MG tablet, Take  by mouth daily., Disp: , Rfl:   •  fluticasone (FLONASE) 50 MCG/ACT nasal spray, into each nostril Daily As Needed., Disp: , Rfl:   •  lisinopril (PRINIVIL,ZESTRIL) 40 MG tablet, TAKE 1 TABLET DAILY, Disp: 90 tablet, Rfl: 1  •  simvastatin (ZOCOR) 40 MG tablet, TAKE 1 TABLET DAILY, Disp: 90 tablet, Rfl: 1  •  tadalafil (CIALIS) 5 MG tablet, TAKE ONE TABLET BY MOUTH DAILY, Disp: 6 tablet, Rfl: 4     Allergies    No Known Allergies    Problem List    Patient Active Problem List   Diagnosis   • Diabetes mellitus (CMS/[facility])   • Hyperlipidemia   • Hypertension   • Gastroesophageal reflux disease   • Enlarged prostate without lower urinary tract symptoms (luts)       Medications, Allergies, Problems List and Past History were reviewed and updated.    Physical Examination    /80 (BP Location: Left arm, Patient Position: Sitting, Cuff Size: Adult)   Pulse 64   Temp 97.1 °F (36.2 °C)  "(Temporal)   Resp 16   Ht 193.7 cm (76.25\")   Wt 105 kg (231 lb)   BMI 27.93 kg/m²     HEENT: Head- Normocephalic Atraumatic. Facies- Within normal limits. Pinnas- Normal texture and shape bilaterally. Canals- Normal bilaterally. TMs- Normal bilaterally. Nares- Patent bilaterally. Nasal Septum- is normal. There is no tenderness to palpation over the frontal or maxillary sinuses. Lids- Normal bilaterally. Conjunctiva- Clear bilaterally. Sclera- Anicteric bilaterally. Oropharynx- Moist with no lesions. Tonsils- No enlargement, erythema or exudate.    Neck: Thyroid- non enlarged, symmetric and has no nodules. No bruits are detected. ROM- Normal Range of Motion with no rigidity.    Lungs: Auscultation- Clear to auscultation bilaterally. There are no retractions, clubbing or cyanosis. The Expiratory to Inspiratory ratio is equal.    Lymph Nodes: Cervical- no enlarged lymph nodes noted. Clavicular- Deferred. Axillary- Deferred. Inguinal- Deferred.    Cardiovascular: There are no carotid bruits. Heart- Normal Rate with Regular rhythm and no murmurs. There are no gallops. There are no rubs. In the lower extremities there is no edema. The upper extremities do not have edema.    Abdomen: Soft, benign, non-tender with no masses, hernias, organomegaly or scars.    Male Genitourinary: The penis is uncircumcised with testicles found in the scrotum bilaterally. Testicular Size is normal bilaterally. The penis has no anatomic abnormalities.    Rectal: reveals normal external sphincter tone with no external lesions.    Prostate: The prostate gland is symmetric and smooth with no nodularity.    Feet: The feet are symmetric with normal peter landmarks. There is no tenderness to palpation bilaterally. The feet have normal posterior tibial and dorsalis pedis pulses and normal capillary refill bilaterally. The arches are normal bilaterally. There are no skin or nail lesions present. There are no ingrown nails. There are no bunions " noted.    Dermatologic: The patient has no worrisome or suspicious skin lesions noted.    Impression and Assessment    Normal Physical Examination.    Type 2 Diabetes Mellitus without complication without insulin usage.    Essential Hypertension.    Gastroesophageal Reflux Disease.    Hyperlipidemia.    Plan    Gastroesophageal Reflux Disease Plan: The condition is stable. No change is needed in the current plan.    Essential Hypertension Plan: The current plan was continued.    Hyperlipidemia Plan: The patient was instructed to exercise daily, eat a low fat diet and continue his medications.    Type 2 Diabetes Mellitus without complication without insulin usage Plan: The current plan was continued.    Counseling was provided regarding: Adequate Aerobic Exercise, Dental Visits, Flossing Teeth and Heart Healthy Diet.    The following was ordered for screening and health maintenance: CBC w Automated Diff, CMP, Colonoscopy, Lipid Profile, PSA, TSH and U/A.       Immunizations Ordered and Administered: None.    Orlando was seen today for annual exam.    Diagnoses and all orders for this visit:    Type 2 diabetes mellitus without complication, without long-term current use of insulin (CMS/[facility])  -     Comprehensive Metabolic Panel    Physical exam  -     Comprehensive Metabolic Panel  -     Lipid Panel  -     TSH  -     CBC & Differential  -     POC Urinalysis Dipstick, Automated    Essential hypertension  -     Comprehensive Metabolic Panel    Gastroesophageal reflux disease without esophagitis    Hyperlipidemia, unspecified hyperlipidemia type  -     Comprehensive Metabolic Panel  -     Lipid Panel    Screening for colon cancer  -     Ambulatory Referral For Screening Colonoscopy    Prostate cancer screening  -     PSA Screen        Return to Office    The patient was instructed to return for follow-up in 6 months.    The patient was instructed to return sooner if the condition changes, worsens, or does not resolve.

## 2020-07-13 ENCOUNTER — TELEPHONE (OUTPATIENT)
Dept: INTERNAL MEDICINE | Facility: CLINIC | Age: 60
End: 2020-07-13

## 2020-07-21 RX ORDER — SODIUM, POTASSIUM,MAG SULFATES 17.5-3.13G
SOLUTION, RECONSTITUTED, ORAL ORAL
Qty: 2 BOTTLE | Refills: 0 | Status: SHIPPED | OUTPATIENT
Start: 2020-07-21 | End: 2021-01-11

## 2020-07-24 ENCOUNTER — APPOINTMENT (OUTPATIENT)
Dept: PREADMISSION TESTING | Facility: HOSPITAL | Age: 60
End: 2020-07-24

## 2020-07-24 LAB
REF LAB TEST METHOD: NORMAL
SARS-COV-2 RNA RESP QL NAA+PROBE: NOT DETECTED

## 2020-07-24 PROCEDURE — U0002 COVID-19 LAB TEST NON-CDC: HCPCS

## 2020-07-24 PROCEDURE — C9803 HOPD COVID-19 SPEC COLLECT: HCPCS

## 2020-07-24 PROCEDURE — U0004 COV-19 TEST NON-CDC HGH THRU: HCPCS

## 2020-07-28 ENCOUNTER — OUTSIDE FACILITY SERVICE (OUTPATIENT)
Dept: GASTROENTEROLOGY | Facility: CLINIC | Age: 60
End: 2020-07-28

## 2020-07-28 PROCEDURE — 45378 DIAGNOSTIC COLONOSCOPY: CPT | Performed by: INTERNAL MEDICINE

## 2020-09-23 RX ORDER — SIMVASTATIN 40 MG
TABLET ORAL
Qty: 90 TABLET | Refills: 3 | Status: SHIPPED | OUTPATIENT
Start: 2020-09-23 | End: 2021-09-20

## 2020-09-23 RX ORDER — LISINOPRIL 40 MG/1
TABLET ORAL
Qty: 90 TABLET | Refills: 3 | Status: SHIPPED | OUTPATIENT
Start: 2020-09-23 | End: 2021-09-20

## 2020-10-14 ENCOUNTER — FLU SHOT (OUTPATIENT)
Dept: INTERNAL MEDICINE | Facility: CLINIC | Age: 60
End: 2020-10-14

## 2020-10-14 DIAGNOSIS — Z23 IMMUNIZATION DUE: Primary | ICD-10-CM

## 2020-10-14 DIAGNOSIS — Z23 NEED FOR INFLUENZA VACCINATION: ICD-10-CM

## 2020-10-14 PROCEDURE — 90471 IMMUNIZATION ADMIN: CPT | Performed by: INTERNAL MEDICINE

## 2020-10-14 PROCEDURE — 90686 IIV4 VACC NO PRSV 0.5 ML IM: CPT | Performed by: INTERNAL MEDICINE

## 2020-10-14 PROCEDURE — 90472 IMMUNIZATION ADMIN EACH ADD: CPT | Performed by: INTERNAL MEDICINE

## 2020-10-14 PROCEDURE — 90715 TDAP VACCINE 7 YRS/> IM: CPT | Performed by: INTERNAL MEDICINE

## 2020-11-02 DIAGNOSIS — N52.9 ERECTILE DYSFUNCTION, UNSPECIFIED ERECTILE DYSFUNCTION TYPE: Primary | ICD-10-CM

## 2020-11-02 RX ORDER — TADALAFIL 5 MG/1
5 TABLET ORAL DAILY PRN
Qty: 6 TABLET | Refills: 3 | Status: SHIPPED | OUTPATIENT
Start: 2020-11-02 | End: 2020-12-10

## 2020-11-02 NOTE — TELEPHONE ENCOUNTER
LOV:06/22/2020  NOV:01/11/2021  Labs:06/22/2020  The patient was instructed to return for follow-up in 6 months.    The patient was instructed to return sooner if the condition changes, worsens, or does not resolve.

## 2020-12-10 DIAGNOSIS — N52.9 ERECTILE DYSFUNCTION, UNSPECIFIED ERECTILE DYSFUNCTION TYPE: ICD-10-CM

## 2020-12-10 RX ORDER — TADALAFIL 5 MG/1
TABLET ORAL
Qty: 6 TABLET | Refills: 2 | Status: SHIPPED | OUTPATIENT
Start: 2020-12-10 | End: 2020-12-28

## 2020-12-10 NOTE — TELEPHONE ENCOUNTER
LOV:06/22/2020  NOV:01/11/2021  Labs:07/24/2020     Return in about 6 months (around 12/22/2020) for Follow-up.

## 2020-12-28 DIAGNOSIS — N52.9 ERECTILE DYSFUNCTION, UNSPECIFIED ERECTILE DYSFUNCTION TYPE: ICD-10-CM

## 2020-12-28 RX ORDER — TADALAFIL 5 MG/1
TABLET ORAL
Qty: 6 TABLET | Refills: 1 | Status: SHIPPED | OUTPATIENT
Start: 2020-12-28 | End: 2021-01-19

## 2021-01-11 ENCOUNTER — OFFICE VISIT (OUTPATIENT)
Dept: INTERNAL MEDICINE | Facility: CLINIC | Age: 61
End: 2021-01-11

## 2021-01-11 VITALS
TEMPERATURE: 97.8 F | HEART RATE: 52 BPM | WEIGHT: 236 LBS | RESPIRATION RATE: 16 BRPM | BODY MASS INDEX: 28.54 KG/M2 | DIASTOLIC BLOOD PRESSURE: 84 MMHG | SYSTOLIC BLOOD PRESSURE: 132 MMHG

## 2021-01-11 DIAGNOSIS — E11.9 TYPE 2 DIABETES MELLITUS WITHOUT COMPLICATION, WITHOUT LONG-TERM CURRENT USE OF INSULIN (HCC): Primary | ICD-10-CM

## 2021-01-11 DIAGNOSIS — E78.5 HYPERLIPIDEMIA, UNSPECIFIED HYPERLIPIDEMIA TYPE: ICD-10-CM

## 2021-01-11 DIAGNOSIS — K21.9 GASTROESOPHAGEAL REFLUX DISEASE WITHOUT ESOPHAGITIS: ICD-10-CM

## 2021-01-11 DIAGNOSIS — I10 ESSENTIAL HYPERTENSION: ICD-10-CM

## 2021-01-11 LAB
A/C: NORMAL
ALBUMIN SERPL-MCNC: 4.8 G/DL (ref 3.5–5.2)
ALBUMIN/GLOB SERPL: 1.9 G/DL
ALP SERPL-CCNC: 75 U/L (ref 39–117)
ALT SERPL W P-5'-P-CCNC: 28 U/L (ref 1–41)
ANION GAP SERPL CALCULATED.3IONS-SCNC: 9.5 MMOL/L (ref 5–15)
AST SERPL-CCNC: 32 U/L (ref 1–40)
BILIRUB BLD-MCNC: NEGATIVE MG/DL
BILIRUB SERPL-MCNC: 0.7 MG/DL (ref 0–1.2)
BUN SERPL-MCNC: 17 MG/DL (ref 8–23)
BUN/CREAT SERPL: 19.5 (ref 7–25)
CALCIUM SPEC-SCNC: 9.5 MG/DL (ref 8.6–10.5)
CHLORIDE SERPL-SCNC: 103 MMOL/L (ref 98–107)
CHOLEST SERPL-MCNC: 160 MG/DL (ref 0–200)
CLARITY, POC: CLEAR
CO2 SERPL-SCNC: 27.5 MMOL/L (ref 22–29)
COLOR UR: YELLOW
CREAT SERPL-MCNC: 0.87 MG/DL (ref 0.76–1.27)
EXPIRATION DATE: NORMAL
EXPIRATION DATE: NORMAL
GFR SERPL CREATININE-BSD FRML MDRD: 89 ML/MIN/1.73
GLOBULIN UR ELPH-MCNC: 2.5 GM/DL
GLUCOSE SERPL-MCNC: 149 MG/DL (ref 65–99)
GLUCOSE UR STRIP-MCNC: NEGATIVE MG/DL
HBA1C MFR BLD: 7.2 % (ref 4.8–5.6)
HDLC SERPL-MCNC: 43 MG/DL (ref 40–60)
KETONES UR QL: NEGATIVE
LDLC SERPL CALC-MCNC: 95 MG/DL (ref 0–100)
LDLC/HDLC SERPL: 2.14 {RATIO}
LEUKOCYTE EST, POC: NEGATIVE
Lab: NORMAL
Lab: NORMAL
NITRITE UR-MCNC: NEGATIVE MG/ML
PH UR: 7 [PH] (ref 5–8)
POC CREATININE URINE: 10
POC MICROALBUMIN URINE: 10
POTASSIUM SERPL-SCNC: 4.6 MMOL/L (ref 3.5–5.2)
PROT SERPL-MCNC: 7.3 G/DL (ref 6–8.5)
PROT UR STRIP-MCNC: NEGATIVE MG/DL
RBC # UR STRIP: NEGATIVE /UL
SODIUM SERPL-SCNC: 140 MMOL/L (ref 136–145)
SP GR UR: 1.01 (ref 1–1.03)
TRIGL SERPL-MCNC: 125 MG/DL (ref 0–150)
UROBILINOGEN UR QL: NORMAL
VLDLC SERPL-MCNC: 22 MG/DL (ref 5–40)

## 2021-01-11 PROCEDURE — 80061 LIPID PANEL: CPT | Performed by: INTERNAL MEDICINE

## 2021-01-11 PROCEDURE — 82044 UR ALBUMIN SEMIQUANTITATIVE: CPT | Performed by: INTERNAL MEDICINE

## 2021-01-11 PROCEDURE — 80053 COMPREHEN METABOLIC PANEL: CPT | Performed by: INTERNAL MEDICINE

## 2021-01-11 PROCEDURE — 99214 OFFICE O/P EST MOD 30 MIN: CPT | Performed by: INTERNAL MEDICINE

## 2021-01-11 PROCEDURE — 81003 URINALYSIS AUTO W/O SCOPE: CPT | Performed by: INTERNAL MEDICINE

## 2021-01-11 PROCEDURE — 83036 HEMOGLOBIN GLYCOSYLATED A1C: CPT | Performed by: INTERNAL MEDICINE

## 2021-01-11 NOTE — PROGRESS NOTES
Chief Complaint   Patient presents with   • Follow-up     6 month follow up chronic medical problems       History of Present Illness    The patient presents for a follow-up related to hyperlipidemia. He is following a low fat diet. He reports that he is exercising. He is taking simvastatin. The patient is taking his medication as prescribed. He reports no medication side effects, including muscle cramps, abdominal pain, headaches or weakness. He denies chest pain, shortness of breath, orthopnea, paroxysmal nocturnal dyspnea, dyspnea on exertion, edema, palpitations or syncope.    The patient presents for a follow-up related to hypertension. The patient reports that he has had no headaches or blurred vision. He states that he is taking his medication as prescribed. He is not having medication side effects.    The patient presents for a follow-up related to GERD. The patient is not on medication for his gastroesophageal reflux. He reports no abdominal pain, belching, diarrhea, dysphagia, early satiety, heartburn, hoarseness, nausea, odynophagia, rectal bleeding, vomiting or weight loss. The GERD has no known aggravating factors.    The patient presents for a follow-up related to Type 2 Diabetes Mellitus. He does not check his blood sugars at home. He denies hypoglycemic symptoms. The patient denies polyuria, polydypsia or polyphagia. He reports no symptoms of neuropathy. The patient takes his medication as prescribed. He is not taking insulin. The patient does check his feet daily at home.    Review of Systems    CONSTITUTIONAL- Denies Fever, Chills, Sweats, Fatigue, Weakness or Malaise.    PULMONARY- Denies Wheezing, Sputum Production, Cough, Hemoptysis or Pleuritic Chest Pain.    CARDIOVASCULAR- Denies Claudication or Irregular Heart Beat.    Medications      Current Outpatient Medications:   •  aspirin (ASPIRIN LOW DOSE) 81 MG tablet, Take  by mouth daily., Disp: , Rfl:   •  fluticasone (FLONASE) 50 MCG/ACT nasal  spray, into each nostril Daily As Needed., Disp: , Rfl:   •  lisinopril (PRINIVIL,ZESTRIL) 40 MG tablet, TAKE 1 TABLET DAILY, Disp: 90 tablet, Rfl: 3  •  simvastatin (ZOCOR) 40 MG tablet, TAKE 1 TABLET DAILY, Disp: 90 tablet, Rfl: 3  •  tadalafil (CIALIS) 5 MG tablet, TAKE ONE TABLET BY MOUTH DAILY AS NEEDED, Disp: 6 tablet, Rfl: 1     Allergies    No Known Allergies    Problem List    Patient Active Problem List   Diagnosis   • Diabetes mellitus (CMS/HCC)   • Hyperlipidemia   • Hypertension   • Gastroesophageal reflux disease   • Enlarged prostate without lower urinary tract symptoms (luts)       Medications, Allergies, Problems List and Past History were reviewed and updated.    Physical Examination    /84 (BP Location: Left arm, Patient Position: Sitting, Cuff Size: Adult)   Pulse 52   Temp 97.8 °F (36.6 °C) (Infrared)   Resp 16   Wt 107 kg (236 lb)   BMI 28.54 kg/m²     HEENT: Facies- Within normal limits. Lids- Normal bilaterally. Conjunctiva- Clear bilaterally. Sclera- Anicteric bilaterally.    Neck: Thyroid- non enlarged, symmetric and has no nodules. No bruits are detected.    Lungs: Auscultation- Clear to auscultation bilaterally. There are no retractions, clubbing or cyanosis. The Expiratory to Inspiratory ratio is equal.    Cardiovascular: There are no carotid bruits. Heart- Normal Rate with Regular rhythm and no murmurs. There are no gallops. There are no rubs. In the lower extremities there is no edema. The upper extremities do not have edema.    Abdomen: Soft, benign, non-tender with no masses, hernias, organomegaly or scars.    Impression and Assessment    Hyperlipidemia.    Essential Hypertension.    Gastroesophageal Reflux Disease.    Type 2 Diabetes Mellitus.    Plan    Gastroesophageal Reflux Disease Plan: The condition is stable. No change is needed in the current plan.    Hyperlipidemia Plan: The patient was instructed to exercise daily, eat a low fat diet and continue his  medications.    Essential Hypertension Plan: The current plan was continued.    Type 2 Diabetes Mellitus Plan: Further plans will be formulated after test results are reviewed.    Diagnoses and all orders for this visit:    1. Type 2 diabetes mellitus without complication, without long-term current use of insulin (CMS/[facility]) (Primary)  -     Comprehensive Metabolic Panel  -     Hemoglobin A1c  -     POC Microalbumin    2. Essential hypertension  -     Comprehensive Metabolic Panel  -     POC Urinalysis Dipstick, Automated    3. Gastroesophageal reflux disease without esophagitis    4. Hyperlipidemia, unspecified hyperlipidemia type  -     Comprehensive Metabolic Panel  -     Lipid Panel        Return to Office    The patient was instructed to return for follow-up in 6 months. Next Visit: Physical Examination. The patient was instructed to return sooner if the condition changes, worsens, or does not resolve.

## 2021-01-19 DIAGNOSIS — N52.9 ERECTILE DYSFUNCTION, UNSPECIFIED ERECTILE DYSFUNCTION TYPE: ICD-10-CM

## 2021-01-19 RX ORDER — TADALAFIL 5 MG/1
TABLET ORAL
Qty: 6 TABLET | Refills: 1 | Status: SHIPPED | OUTPATIENT
Start: 2021-01-19 | End: 2021-03-03

## 2021-01-19 RX ORDER — TADALAFIL 5 MG/1
TABLET ORAL
Qty: 6 TABLET | Refills: 0 | Status: SHIPPED | OUTPATIENT
Start: 2021-01-19 | End: 2021-06-10

## 2021-03-03 DIAGNOSIS — N52.9 ERECTILE DYSFUNCTION, UNSPECIFIED ERECTILE DYSFUNCTION TYPE: ICD-10-CM

## 2021-03-03 RX ORDER — TADALAFIL 5 MG/1
TABLET ORAL
Qty: 6 TABLET | Refills: 0 | Status: SHIPPED | OUTPATIENT
Start: 2021-03-03 | End: 2021-05-20 | Stop reason: SDUPTHER

## 2021-03-25 DIAGNOSIS — N52.9 ERECTILE DYSFUNCTION, UNSPECIFIED ERECTILE DYSFUNCTION TYPE: ICD-10-CM

## 2021-03-25 RX ORDER — TADALAFIL 5 MG/1
TABLET ORAL
Qty: 30 TABLET | Refills: 0 | Status: SHIPPED | OUTPATIENT
Start: 2021-03-25 | End: 2021-05-18

## 2021-03-29 PROBLEM — F52.21 ERECTILE DYSFUNCTION OF NONORGANIC ORIGIN: Status: ACTIVE | Noted: 2021-03-29

## 2021-04-29 ENCOUNTER — OFFICE VISIT (OUTPATIENT)
Dept: INTERNAL MEDICINE | Facility: CLINIC | Age: 61
End: 2021-04-29

## 2021-04-29 ENCOUNTER — HOSPITAL ENCOUNTER (OUTPATIENT)
Dept: GENERAL RADIOLOGY | Facility: HOSPITAL | Age: 61
Discharge: HOME | End: 2021-04-29
Admitting: INTERNAL MEDICINE

## 2021-04-29 VITALS
SYSTOLIC BLOOD PRESSURE: 146 MMHG | RESPIRATION RATE: 18 BRPM | TEMPERATURE: 98 F | HEART RATE: 60 BPM | WEIGHT: 228 LBS | BODY MASS INDEX: 27.57 KG/M2 | DIASTOLIC BLOOD PRESSURE: 84 MMHG

## 2021-04-29 DIAGNOSIS — M54.42 ACUTE LEFT-SIDED LOW BACK PAIN WITH LEFT-SIDED SCIATICA: Primary | ICD-10-CM

## 2021-04-29 DIAGNOSIS — M54.42 ACUTE LEFT-SIDED LOW BACK PAIN WITH LEFT-SIDED SCIATICA: ICD-10-CM

## 2021-04-29 PROCEDURE — 99214 OFFICE O/P EST MOD 30 MIN: CPT | Performed by: INTERNAL MEDICINE

## 2021-04-29 PROCEDURE — 72100 X-RAY EXAM L-S SPINE 2/3 VWS: CPT

## 2021-04-29 RX ORDER — METHYLPREDNISOLONE 4 MG/1
TABLET ORAL
Qty: 21 TABLET | Refills: 0 | Status: SHIPPED | OUTPATIENT
Start: 2021-04-29 | End: 2021-05-20

## 2021-05-18 DIAGNOSIS — N52.9 ERECTILE DYSFUNCTION, UNSPECIFIED ERECTILE DYSFUNCTION TYPE: ICD-10-CM

## 2021-05-18 RX ORDER — TADALAFIL 5 MG/1
TABLET ORAL
Qty: 18 TABLET | Refills: 0 | Status: SHIPPED | OUTPATIENT
Start: 2021-05-18 | End: 2021-05-20 | Stop reason: SDUPTHER

## 2021-05-20 ENCOUNTER — OFFICE VISIT (OUTPATIENT)
Dept: INTERNAL MEDICINE | Facility: CLINIC | Age: 61
End: 2021-05-20

## 2021-05-20 VITALS
SYSTOLIC BLOOD PRESSURE: 122 MMHG | HEART RATE: 64 BPM | WEIGHT: 219 LBS | TEMPERATURE: 98 F | BODY MASS INDEX: 26.48 KG/M2 | RESPIRATION RATE: 14 BRPM | DIASTOLIC BLOOD PRESSURE: 80 MMHG

## 2021-05-20 DIAGNOSIS — M54.42 ACUTE LEFT-SIDED LOW BACK PAIN WITH LEFT-SIDED SCIATICA: Primary | ICD-10-CM

## 2021-05-20 PROCEDURE — 99213 OFFICE O/P EST LOW 20 MIN: CPT | Performed by: INTERNAL MEDICINE

## 2021-05-20 RX ORDER — METHYLPREDNISOLONE 4 MG/1
TABLET ORAL
Qty: 21 TABLET | Refills: 0 | Status: SHIPPED | OUTPATIENT
Start: 2021-05-20 | End: 2021-06-28

## 2021-05-20 NOTE — PROGRESS NOTES
Chief Complaint   Patient presents with   • Follow-up     3 week follow up back pain       History of Present Illness      The patient presents for follow-up of low back pain. He has had symptoms for 9 weeks. He reports that the pain is stable. The current treatment consists of physical therapy and steroids. The steroids provided relief. He hasn't gotten relief from PT.  He does not have associated fever, chills, weight loss, rashes, dysuria, hematuria, or a decreased urine stream. The pain does not radiate. There is no numbness. The patient denies loss of bladder control. The patient denies loss of bowel control. The patient reports lifting and bending aggravate the symptoms.    Medications      Current Outpatient Medications:   •  aspirin (ASPIRIN LOW DOSE) 81 MG tablet, Take  by mouth daily., Disp: , Rfl:   •  fluticasone (FLONASE) 50 MCG/ACT nasal spray, into each nostril Daily As Needed., Disp: , Rfl:   •  lisinopril (PRINIVIL,ZESTRIL) 40 MG tablet, TAKE 1 TABLET DAILY, Disp: 90 tablet, Rfl: 3  •  simvastatin (ZOCOR) 40 MG tablet, TAKE 1 TABLET DAILY, Disp: 90 tablet, Rfl: 3  •  tadalafil (CIALIS) 5 MG tablet, TAKE ONE TABLET BY MOUTH DAILY AS NEEDED, Disp: 6 tablet, Rfl: 0  •  methylPREDNISolone (MEDROL) 4 MG dose pack, Take as directed on package instructions., Disp: 21 tablet, Rfl: 0     Allergies    No Known Allergies    Problem List    Patient Active Problem List   Diagnosis   • Diabetes mellitus (CMS/HCC)   • Hyperlipidemia   • Hypertension   • Gastroesophageal reflux disease   • Enlarged prostate without lower urinary tract symptoms (luts)   • Erectile dysfunction of nonorganic origin       Medications, Allergies, Problems List and Past History were reviewed and updated.    Physical Examination    /80 (BP Location: Right arm, Patient Position: Sitting, Cuff Size: Adult)   Pulse 64   Temp 98 °F (36.7 °C) (Infrared)   Resp 14   Wt 99.3 kg (219 lb)   BMI 26.48 kg/m²     Abdomen: Soft, benign,  non-tender with no masses, hernias, organomegaly or scars.    Back: The patient has a normal spinal curvature with no evidence of scoliosis. There are no skin lesions noted on the back. Palpation reveals no tenderness and normal muscle tone. The straight leg raising test is negative. The back has normal flexion, extension, rotation, and lateral bending.    Lower Extremity Neuro Exam: Muscle Strength is normal (5/5) in all major lower extremity muscle groups except the L hip abductors, L hip adductors, L hip flexors and L hip extensors. The left hip abductors have 4/5 strength. The left hip adductors have 4/5 strength. The left hip flexors have 4/5 strength. The left hip extensors have 4/5 strength. Deep Tendon Reflexes are 2+ and equal in the patellar and Achilles distributions bilaterally. Sensation is normal in all distributions.    Impression and Assessment    Low Back Pain with Sciatica.    Plan    Low Back Pain with Sciatica Plan: He should continue his physical therapy. Medication will be added as noted. An MRI will be obtained.    Diagnoses and all orders for this visit:    1. Acute left-sided low back pain with left-sided sciatica (Primary)  -     methylPREDNISolone (MEDROL) 4 MG dose pack; Take as directed on package instructions.  Dispense: 21 tablet; Refill: 0  -     MRI Lumbar Spine Without Contrast; Future  -     Ambulatory Referral to Physical Therapy Evaluate and treat        Return to Office    The patient was instructed to return for follow-up in 1 month. The patient was instructed to return sooner if the condition changes, worsens, or does not resolve.

## 2021-05-30 DIAGNOSIS — M54.42 ACUTE LEFT-SIDED LOW BACK PAIN WITH LEFT-SIDED SCIATICA: Primary | ICD-10-CM

## 2021-05-30 DIAGNOSIS — M48.061 SPINAL STENOSIS OF LUMBAR REGION, UNSPECIFIED WHETHER NEUROGENIC CLAUDICATION PRESENT: ICD-10-CM

## 2021-06-10 DIAGNOSIS — N52.9 ERECTILE DYSFUNCTION, UNSPECIFIED ERECTILE DYSFUNCTION TYPE: ICD-10-CM

## 2021-06-10 RX ORDER — TADALAFIL 5 MG/1
TABLET ORAL
Qty: 30 TABLET | Refills: 2 | Status: SHIPPED | OUTPATIENT
Start: 2021-06-10 | End: 2021-12-28

## 2021-06-28 ENCOUNTER — OFFICE VISIT (OUTPATIENT)
Dept: NEUROSURGERY | Facility: CLINIC | Age: 61
End: 2021-06-28

## 2021-06-28 VITALS
DIASTOLIC BLOOD PRESSURE: 80 MMHG | SYSTOLIC BLOOD PRESSURE: 120 MMHG | BODY MASS INDEX: 25.77 KG/M2 | WEIGHT: 211.6 LBS | TEMPERATURE: 96.9 F | HEIGHT: 76 IN

## 2021-06-28 DIAGNOSIS — M51.16 LUMBAR DISC HERNIATION WITH RADICULOPATHY: Primary | ICD-10-CM

## 2021-06-28 DIAGNOSIS — M51.36 DDD (DEGENERATIVE DISC DISEASE), LUMBAR: ICD-10-CM

## 2021-06-28 PROBLEM — J30.9 ATOPIC RHINITIS: Status: ACTIVE | Noted: 2021-06-28

## 2021-06-28 PROBLEM — K76.89 LIVER CYST: Status: ACTIVE | Noted: 2021-06-28

## 2021-06-28 PROBLEM — J20.9 ACUTE BRONCHITIS: Status: ACTIVE | Noted: 2021-06-28

## 2021-06-28 PROCEDURE — 99204 OFFICE O/P NEW MOD 45 MIN: CPT | Performed by: NEUROLOGICAL SURGERY

## 2021-06-28 NOTE — PROGRESS NOTES
NAME: SHANICE TYLER   DOS: 2021  : 1960  PCP: Mustapha Yoon MD    Chief Complaint:    Chief Complaint   Patient presents with   • Low back & left leg pain       History of Present Illness:  60 y.o. male   Is a 60-year-old male in neurosurgical consultation for the presence of back issues who presents with about a 3-month on again off again history began in the right-sided paraspinal issue radiate to his right anterior quadricep and then interestingly shifted to his left side without associated with significant neurogenic claudication system symptoms.  He walks about 4 miles a day denies any cauda equina syndrome and has not been through any injections other than systemic steroids.  He is here for evaluation he denies any progressive symptomatology  He is retired from [employer]  PMH[employer]  Allergies:  No Known Allergies  Medications    Current Outpatient Medications:   •  aspirin (ASPIRIN LOW DOSE) 81 MG tablet, Take  by mouth daily., Disp: , Rfl:   •  fluticasone (FLONASE) 50 MCG/ACT nasal spray, into each nostril Daily As Needed., Disp: , Rfl:   •  lisinopril (PRINIVIL,ZESTRIL) 40 MG tablet, TAKE 1 TABLET DAILY, Disp: 90 tablet, Rfl: 3  •  simvastatin (ZOCOR) 40 MG tablet, TAKE 1 TABLET DAILY, Disp: 90 tablet, Rfl: 3  •  tadalafil (CIALIS) 5 MG tablet, TAKE ONE TABLET BY MOUTH DAILY AS NEEDED, Disp: 30 tablet, Rfl: 2  Past Medical History:  Past Medical History:   Diagnosis Date   • GERD (gastroesophageal reflux disease)    • Hyperlipidemia    • Hypertension      Past Surgical History:  Past Surgical History:   Procedure Laterality Date   • VASECTOMY       Social Hx:  Social History     Tobacco Use   • Smoking status: Never Smoker   • Smokeless tobacco: Never Used   Vaping Use   • Vaping Use: Never used   Substance Use Topics   • Alcohol use: No   • Drug use: No     Family Hx:  Family History   Problem Relation Age of Onset   • Diabetes Father    • Heart disease Father    • Heart disease  Mother      Review of Systems:        Review of Systems   Constitutional: Negative for activity change, appetite change, chills, diaphoresis, fatigue, fever and unexpected weight change.   HENT: Negative for congestion, dental problem, drooling, ear discharge, ear pain, facial swelling, hearing loss, mouth sores, nosebleeds, postnasal drip, rhinorrhea, sinus pressure, sneezing, sore throat, tinnitus, trouble swallowing and voice change.    Eyes: Negative for photophobia, pain, discharge, redness, itching and visual disturbance.   Respiratory: Negative for apnea, cough, choking, chest tightness, shortness of breath, wheezing and stridor.    Cardiovascular: Negative for chest pain, palpitations and leg swelling.   Gastrointestinal: Negative for abdominal distention, abdominal pain, anal bleeding, blood in stool, constipation, diarrhea, nausea, rectal pain and vomiting.   Endocrine: Negative for cold intolerance, heat intolerance, polydipsia, polyphagia and polyuria.   Genitourinary: Negative for decreased urine volume, difficulty urinating, dysuria, enuresis, flank pain, frequency, genital sores, hematuria and urgency.   Musculoskeletal: Positive for back pain. Negative for arthralgias, gait problem, joint swelling, myalgias, neck pain and neck stiffness.   Skin: Negative for color change, pallor, rash and wound.   Allergic/Immunologic: Negative for environmental allergies, food allergies and immunocompromised state.   Neurological: Negative for dizziness, tremors, seizures, syncope, facial asymmetry, speech difficulty, weakness, light-headedness, numbness and headaches.   Hematological: Negative for adenopathy. Does not bruise/bleed easily.   Psychiatric/Behavioral: Negative for agitation, behavioral problems, confusion, decreased concentration, dysphoric mood, hallucinations, self-injury, sleep disturbance and suicidal ideas. The patient is not nervous/anxious and is not hyperactive.    All other systems reviewed  and are negative.     I have reviewed this note template and all pertinent parts of the review of systems social, family history, surgical history and medication list      Physical Examination:  Vitals:    06/28/21 1059   BP: 120/80   Temp: 96.9 °F (36.1 °C)      General Appearance:   Well developed, well nourished, well groomed, alert, and cooperative.  Neurological examination:  Neurologic Exam  Vital signs were reviewed and documented in the chart  Patient appeared in good neurologic function with normal comprehension fluent speech  Mood and affect are normal  Sense of smell deferred    Pupils symmetric equally reactive funduscopic exam not visualized   Visual fields intact to confrontation  Extraocular movements intact  Face motor function is symmetric  Facial sensations normal  Hearing intact to finger rub hearing intact to finger rub  Tongue is midline  Palate symmetric  Swallowing normal  Shoulder shrug normal    Muscle bulk and tone normal  5 out of 5 strength no motor drift  Gait normal intact  Negative Romberg  No clonus long tract signs or myelopathy    Reflexes symmetrically absent throughout  No edema noted and extremities skin appears normal  Decreased vibratory sensation bilaterally  Straight leg raise sign absent  No signs of intrinsic hip dysfunction  Back is without any lesions or abnormality  Feet are warm and well perfused        Review of Imaging/DATA:  I reviewed his MRI and interpreted it personally he presents with a L2-3 lumbar disc herniation with left a centricity there is a moderate degree of central canal stenosis with some congenital canal stenosis noted at other levels  Diagnoses/Plan:    Mr. Meraz is a 60 y.o. male   There is a 60-year-old gentleman who presents with a somewhat centralized and left eccentric L2-3 disc herniation that subacute he presented with some bilateral but mostly LEFT now sciatic leg pain he denies neurogenic claudication and he has had improvement with  steroids.  His MRI is somewhat impressive in terms of the moderate degree of central canal stenosis and I discussed with him the surgical options of an elective major surgery the risk benefits expected outcome as well as most importantly the risk of failure and need for additional surgeries.  From my standpoint given his consistent improvement I think it is reasonable to monitor him many times disc herniations like this will scar down and shrink with time and that is what we will hope for    He would be a candidate for discectomy at a left L2-3 area I explained if he had worsening I Destiny set him up for a follow-up in 90 days I talked to him about potentially limiting his longer walks and changing his exercise routine  Was a pleasure to talk to him

## 2021-07-28 ENCOUNTER — OFFICE VISIT (OUTPATIENT)
Dept: INTERNAL MEDICINE | Facility: CLINIC | Age: 61
End: 2021-07-28

## 2021-07-28 VITALS
RESPIRATION RATE: 20 BRPM | HEART RATE: 55 BPM | WEIGHT: 207.25 LBS | HEIGHT: 76 IN | BODY MASS INDEX: 25.24 KG/M2 | DIASTOLIC BLOOD PRESSURE: 87 MMHG | SYSTOLIC BLOOD PRESSURE: 127 MMHG | TEMPERATURE: 98.4 F

## 2021-07-28 DIAGNOSIS — K21.9 GASTROESOPHAGEAL REFLUX DISEASE WITHOUT ESOPHAGITIS: ICD-10-CM

## 2021-07-28 DIAGNOSIS — Z12.5 PROSTATE CANCER SCREENING: ICD-10-CM

## 2021-07-28 DIAGNOSIS — E11.9 TYPE 2 DIABETES MELLITUS WITHOUT COMPLICATION, WITHOUT LONG-TERM CURRENT USE OF INSULIN (HCC): ICD-10-CM

## 2021-07-28 DIAGNOSIS — I10 ESSENTIAL HYPERTENSION: ICD-10-CM

## 2021-07-28 DIAGNOSIS — E78.5 HYPERLIPIDEMIA, UNSPECIFIED HYPERLIPIDEMIA TYPE: ICD-10-CM

## 2021-07-28 DIAGNOSIS — Z00.00 PHYSICAL EXAM: Primary | ICD-10-CM

## 2021-07-28 LAB
A/C: NORMAL
ALBUMIN SERPL-MCNC: 4.5 G/DL (ref 3.5–5.2)
ALBUMIN/GLOB SERPL: 1.8 G/DL
ALP SERPL-CCNC: 73 U/L (ref 39–117)
ALT SERPL W P-5'-P-CCNC: 21 U/L (ref 1–41)
ANION GAP SERPL CALCULATED.3IONS-SCNC: 7 MMOL/L (ref 5–15)
AST SERPL-CCNC: 25 U/L (ref 1–40)
BASOPHILS # BLD AUTO: 0.03 10*3/MM3 (ref 0–0.2)
BASOPHILS NFR BLD AUTO: 0.7 % (ref 0–1.5)
BILIRUB BLD-MCNC: NEGATIVE MG/DL
BILIRUB SERPL-MCNC: 1.1 MG/DL (ref 0–1.2)
BUN SERPL-MCNC: 10 MG/DL (ref 8–23)
BUN/CREAT SERPL: 9.9 (ref 7–25)
CALCIUM SPEC-SCNC: 9.5 MG/DL (ref 8.6–10.5)
CHLORIDE SERPL-SCNC: 106 MMOL/L (ref 98–107)
CHOLEST SERPL-MCNC: 144 MG/DL (ref 0–200)
CLARITY, POC: CLEAR
CO2 SERPL-SCNC: 26 MMOL/L (ref 22–29)
COLOR UR: YELLOW
CREAT SERPL-MCNC: 1.01 MG/DL (ref 0.76–1.27)
DEPRECATED RDW RBC AUTO: 45 FL (ref 37–54)
EOSINOPHIL # BLD AUTO: 0.1 10*3/MM3 (ref 0–0.4)
EOSINOPHIL NFR BLD AUTO: 2.3 % (ref 0.3–6.2)
ERYTHROCYTE [DISTWIDTH] IN BLOOD BY AUTOMATED COUNT: 12.6 % (ref 12.3–15.4)
GFR SERPL CREATININE-BSD FRML MDRD: 75 ML/MIN/1.73
GLOBULIN UR ELPH-MCNC: 2.5 GM/DL
GLUCOSE SERPL-MCNC: 121 MG/DL (ref 65–99)
GLUCOSE UR STRIP-MCNC: NEGATIVE MG/DL
HBA1C MFR BLD: 5.88 % (ref 4.8–5.6)
HCT VFR BLD AUTO: 45.3 % (ref 37.5–51)
HDLC SERPL-MCNC: 51 MG/DL (ref 40–60)
HGB BLD-MCNC: 14.8 G/DL (ref 13–17.7)
IMM GRANULOCYTES # BLD AUTO: 0.01 10*3/MM3 (ref 0–0.05)
IMM GRANULOCYTES NFR BLD AUTO: 0.2 % (ref 0–0.5)
KETONES UR QL: NEGATIVE
LDLC SERPL CALC-MCNC: 78 MG/DL (ref 0–100)
LDLC/HDLC SERPL: 1.52 {RATIO}
LEUKOCYTE EST, POC: NEGATIVE
LYMPHOCYTES # BLD AUTO: 1.44 10*3/MM3 (ref 0.7–3.1)
LYMPHOCYTES NFR BLD AUTO: 33.6 % (ref 19.6–45.3)
MCH RBC QN AUTO: 31.3 PG (ref 26.6–33)
MCHC RBC AUTO-ENTMCNC: 32.7 G/DL (ref 31.5–35.7)
MCV RBC AUTO: 95.8 FL (ref 79–97)
MONOCYTES # BLD AUTO: 0.44 10*3/MM3 (ref 0.1–0.9)
MONOCYTES NFR BLD AUTO: 10.3 % (ref 5–12)
NEUTROPHILS NFR BLD AUTO: 2.27 10*3/MM3 (ref 1.7–7)
NEUTROPHILS NFR BLD AUTO: 52.9 % (ref 42.7–76)
NITRITE UR-MCNC: NEGATIVE MG/ML
NRBC BLD AUTO-RTO: 0 /100 WBC (ref 0–0.2)
PH UR: 6 [PH] (ref 5–8)
PLATELET # BLD AUTO: 192 10*3/MM3 (ref 140–450)
PMV BLD AUTO: 11.5 FL (ref 6–12)
POC CREATININE URINE: NORMAL
POC MICROALBUMIN URINE: NORMAL
POTASSIUM SERPL-SCNC: 4.2 MMOL/L (ref 3.5–5.2)
PROT SERPL-MCNC: 7 G/DL (ref 6–8.5)
PROT UR STRIP-MCNC: NEGATIVE MG/DL
PSA SERPL-MCNC: 0.93 NG/ML (ref 0–4)
RBC # BLD AUTO: 4.73 10*6/MM3 (ref 4.14–5.8)
RBC # UR STRIP: NEGATIVE /UL
SODIUM SERPL-SCNC: 139 MMOL/L (ref 136–145)
SP GR UR: 1.01 (ref 1–1.03)
TRIGL SERPL-MCNC: 77 MG/DL (ref 0–150)
TSH SERPL DL<=0.05 MIU/L-ACNC: 1.01 UIU/ML (ref 0.27–4.2)
UROBILINOGEN UR QL: NORMAL
VLDLC SERPL-MCNC: 15 MG/DL (ref 5–40)
WBC # BLD AUTO: 4.29 10*3/MM3 (ref 3.4–10.8)

## 2021-07-28 PROCEDURE — 83036 HEMOGLOBIN GLYCOSYLATED A1C: CPT | Performed by: INTERNAL MEDICINE

## 2021-07-28 PROCEDURE — 80053 COMPREHEN METABOLIC PANEL: CPT | Performed by: INTERNAL MEDICINE

## 2021-07-28 PROCEDURE — 99396 PREV VISIT EST AGE 40-64: CPT | Performed by: INTERNAL MEDICINE

## 2021-07-28 PROCEDURE — 81003 URINALYSIS AUTO W/O SCOPE: CPT | Performed by: INTERNAL MEDICINE

## 2021-07-28 PROCEDURE — 82044 UR ALBUMIN SEMIQUANTITATIVE: CPT | Performed by: INTERNAL MEDICINE

## 2021-07-28 PROCEDURE — 85025 COMPLETE CBC W/AUTO DIFF WBC: CPT | Performed by: INTERNAL MEDICINE

## 2021-07-28 PROCEDURE — 84443 ASSAY THYROID STIM HORMONE: CPT | Performed by: INTERNAL MEDICINE

## 2021-07-28 PROCEDURE — G0103 PSA SCREENING: HCPCS | Performed by: INTERNAL MEDICINE

## 2021-07-28 PROCEDURE — 80061 LIPID PANEL: CPT | Performed by: INTERNAL MEDICINE

## 2021-07-28 NOTE — PROGRESS NOTES
Chief Complaint   Patient presents with   • Annual Exam     FASTING        History of Present Illness    The patient presents for an established patient physical examination and health maintenance visit.    The patient presents for a follow-up related to Type 2 Diabetes Mellitus. He does not check his blood sugars at home. He denies hypoglycemic symptoms. The patient denies polyuria, polydypsia or polyphagia. He reports no symptoms of neuropathy. The patient is not on medication for his Type 2 Diabetes Mellitus. The patient does check his feet daily at home. He denies chest pain, shortness of breath, orthopnea, paroxysmal nocturnal dyspnea, dyspnea on exertion, edema, palpitations or syncope.    The patient presents for a follow-up related to GERD. The patient is not on medication for his gastroesophageal reflux. He reports no abdominal pain, belching, diarrhea, dysphagia, early satiety, heartburn, hoarseness, nausea, odynophagia, rectal bleeding, vomiting or weight loss. The GERD has no known aggravating factors.    The patient presents for a follow-up related to hyperlipidemia. He is following a low fat diet. He reports that he is exercising. He is taking simvastatin. The patient is taking his medication as prescribed. He reports no medication side effects, including muscle cramps, abdominal pain, headaches or weakness.    The patient presents for a follow-up related to hypertension. The patient reports that he has had no headaches or blurred vision. He states that he is taking his medication as prescribed. He is not having medication side effects.    Review of Systems    CONSTITUTIONAL- Denies Fever, Chills, Sweats, Fatigue, Weakness or Malaise.    NECK- Denies Decreased Range of Motion, Stiffness, Thyroid Nodules, Enlarged Lymph Nodes or Goiter.    EYES- Denies Eye Pain, Eye Drainage, Eye Redness, Eye Discharge, Decreased Vision, Visual Disturbance, Diplopia, Visual Loss or Swollen Eyelid.    HENT- Denies Nasal  Discharge, Sore Throat, Ear Pain, Ear Drainage, Sinus Pain, Nasal Congestion, Decreased Hearing or Tinnitus.    PULMONARY- Denies Wheezing, Sputum Production, Cough, Hemoptysis or Pleuritic Chest Pain.    GASTROINTESTINAL- Denies Constipation.    GENITOURINARY- Denies Penile Discharge, Erectile Dysfunction, Testicular Mass, Testicular Pain, Hernia, Nocturia, Decreased Urine Stream, Incomplete Voiding, Urinary Frequency, Urinary Urgency, Dysuria or Hematuria.    CARDIOVASCULAR- Denies Claudication or Irregular Heart Beat.    ENDOCRINE- Denies Cold Intolerance, Depression, Hair Loss, Heat Intolerance, Memory Loss, Sleep Disturbance or Weight Gain.    NEUROLOGIC- Denies Seizures, Confusion or Excessive Daytime Sleepiness.    MUSCULOSKELETAL- Denies Joint Pain, Joint Stiffness, Decreased Range of Motion, Joint Swelling or Erythema of Joints.    INTEGUMENTARY- Denies Rash, Lumps, Sores, Itching, Dryness, Color Change, Changes in Hair, Brittle Nails, Discolored Nails, Thickened Nails, Growths or Alopecia.    Medications      Current Outpatient Medications:   •  aspirin (ASPIRIN LOW DOSE) 81 MG tablet, Take  by mouth daily., Disp: , Rfl:   •  fluticasone (FLONASE) 50 MCG/ACT nasal spray, into each nostril Daily As Needed., Disp: , Rfl:   •  lisinopril (PRINIVIL,ZESTRIL) 40 MG tablet, TAKE 1 TABLET DAILY, Disp: 90 tablet, Rfl: 3  •  simvastatin (ZOCOR) 40 MG tablet, TAKE 1 TABLET DAILY, Disp: 90 tablet, Rfl: 3  •  tadalafil (CIALIS) 5 MG tablet, TAKE ONE TABLET BY MOUTH DAILY AS NEEDED, Disp: 30 tablet, Rfl: 2     Allergies    No Known Allergies    Problem List    Patient Active Problem List   Diagnosis   • Diabetes mellitus (CMS/[facility])   • Hyperlipidemia   • Hypertension   • Gastroesophageal reflux disease   • Enlarged prostate without lower urinary tract symptoms (luts)   • Erectile dysfunction of nonorganic origin   • Acute bronchitis   • Atopic rhinitis   • Liver cyst       Medications, Allergies, Problems List and Past  "History were reviewed and updated.    Physical Examination    /87 (BP Location: Left arm)   Pulse 55   Temp 98.4 °F (36.9 °C) (Temporal)   Resp 20   Ht 193.7 cm (76.25\")   Wt 94 kg (207 lb 4 oz)   BMI 25.06 kg/m²     HEENT: Head- Normocephalic Atraumatic. Facies- Within normal limits. Pinnas- Normal texture and shape bilaterally. Canals- Normal bilaterally. TMs- Normal bilaterally. Nares- Patent bilaterally. Nasal Septum- is normal. There is no tenderness to palpation over the frontal or maxillary sinuses. Lids- Normal bilaterally. Conjunctiva- Clear bilaterally. Sclera- Anicteric bilaterally. Oropharynx- Moist with no lesions. Tonsils- No enlargement, erythema or exudate.    Neck: Thyroid- non enlarged, symmetric and has no nodules. No bruits are detected. ROM- Normal Range of Motion with no rigidity.    Lungs: Auscultation- Clear to auscultation bilaterally. There are no retractions, clubbing or cyanosis. The Expiratory to Inspiratory ratio is equal.    Lymph Nodes: Cervical- no enlarged lymph nodes noted. Clavicular- Deferred. Axillary- Deferred. Inguinal- Deferred.    Cardiovascular: There are no carotid bruits. Heart- Normal Rate with Regular rhythm and no murmurs. There are no gallops. There are no rubs. In the lower extremities there is no edema. The upper extremities do not have edema.    Abdomen: Soft, benign, non-tender with no masses, hernias, organomegaly or scars.    Male Genitourinary: The penis is uncircumcised with testicles found in the scrotum bilaterally. Testicular Size is normal bilaterally. The penis has no anatomic abnormalities.    Rectal: reveals normal external sphincter tone with no external lesions.    Prostate: The prostate gland is symmetric and smooth with no nodularity.    Feet: The feet are symmetric with normal peter landmarks. There is no tenderness to palpation bilaterally. The feet have normal posterior tibial and dorsalis pedis pulses and normal capillary refill " bilaterally. The arches are normal bilaterally. There are no skin or nail lesions present. There are no ingrown nails. There are no bunions noted.    Dermatologic: The patient has no worrisome or suspicious skin lesions noted.    Impression and Assessment    Normal Physical Examination.    Encounter for Screening for Malignant Neoplasm of the Prostate.    Type 2 Diabetes Mellitus without complication without insulin usage.    Gastroesophageal Reflux Disease.    Hyperlipidemia.    Essential Hypertension.    Plan    Gastroesophageal Reflux Disease Plan: The condition is stable. No change is needed in the current plan.    Hyperlipidemia Plan: The patient was instructed to exercise daily, eat a low fat diet and continue his medications.    Essential Hypertension Plan: The patient was instructed to continue the current medications.    Type 2 Diabetes Mellitus without complication without insulin usage Plan: The condition is stable. No change is needed in the current plan.    Counseling was provided regarding: Adequate Aerobic Exercise, Dental Visits, Flossing Teeth and Heart Healthy Diet.    The following was ordered for screening and health maintenance: CBC w Automated Diff, CMP, Lipid Profile, PSA, TSH and U/A.       Immunizations Ordered and Administered: None.    Diagnoses and all orders for this visit:    1. Physical exam (Primary)    2. Type 2 diabetes mellitus without complication, without long-term current use of insulin (CMS/[facility])  -     Comprehensive Metabolic Panel  -     POC Microalbumin  -     Hemoglobin A1c    3. Essential hypertension  -     Comprehensive Metabolic Panel  -     TSH  -     CBC & Differential  -     POC Urinalysis Dipstick, Automated    4. Gastroesophageal reflux disease without esophagitis    5. Hyperlipidemia, unspecified hyperlipidemia type  -     Comprehensive Metabolic Panel  -     Lipid Panel    6. Prostate cancer screening  -     PSA Screen        Return to Office    The patient was  instructed to return for follow-up in 6 months. The patient was instructed to return sooner if the condition changes, worsens, or does not resolve.

## 2021-09-20 RX ORDER — LISINOPRIL 40 MG/1
TABLET ORAL
Qty: 90 TABLET | Refills: 3 | Status: SHIPPED | OUTPATIENT
Start: 2021-09-20 | End: 2022-09-15

## 2021-09-20 RX ORDER — SIMVASTATIN 40 MG
TABLET ORAL
Qty: 90 TABLET | Refills: 3 | Status: SHIPPED | OUTPATIENT
Start: 2021-09-20 | End: 2022-09-15

## 2021-11-01 NOTE — TELEPHONE ENCOUNTER
Received PA approval from [employer].      [facility] for pt to please return call.  Ofc # given.    Just need to inform PA approved.        103

## 2021-11-19 ENCOUNTER — FLU SHOT (OUTPATIENT)
Dept: INTERNAL MEDICINE | Facility: CLINIC | Age: 61
End: 2021-11-19

## 2021-11-19 DIAGNOSIS — Z23 NEED FOR INFLUENZA VACCINATION: Primary | ICD-10-CM

## 2021-11-19 PROCEDURE — 90686 IIV4 VACC NO PRSV 0.5 ML IM: CPT | Performed by: INTERNAL MEDICINE

## 2021-11-19 PROCEDURE — 90471 IMMUNIZATION ADMIN: CPT | Performed by: INTERNAL MEDICINE

## 2021-12-28 DIAGNOSIS — N52.9 ERECTILE DYSFUNCTION, UNSPECIFIED ERECTILE DYSFUNCTION TYPE: ICD-10-CM

## 2021-12-28 RX ORDER — TADALAFIL 5 MG/1
TABLET ORAL
Qty: 6 TABLET | Refills: 0 | Status: SHIPPED | OUTPATIENT
Start: 2021-12-28 | End: 2022-01-04

## 2022-01-04 DIAGNOSIS — N52.9 ERECTILE DYSFUNCTION, UNSPECIFIED ERECTILE DYSFUNCTION TYPE: ICD-10-CM

## 2022-01-04 RX ORDER — TADALAFIL 5 MG/1
TABLET ORAL
Qty: 6 TABLET | Refills: 0 | Status: SHIPPED | OUTPATIENT
Start: 2022-01-04 | End: 2022-01-24

## 2022-01-23 DIAGNOSIS — N52.9 ERECTILE DYSFUNCTION, UNSPECIFIED ERECTILE DYSFUNCTION TYPE: ICD-10-CM

## 2022-01-24 RX ORDER — TADALAFIL 5 MG/1
TABLET ORAL
Qty: 6 TABLET | Refills: 2 | Status: SHIPPED | OUTPATIENT
Start: 2022-01-24 | End: 2022-01-27 | Stop reason: SDUPTHER

## 2022-01-27 ENCOUNTER — OFFICE VISIT (OUTPATIENT)
Dept: INTERNAL MEDICINE | Facility: CLINIC | Age: 62
End: 2022-01-27

## 2022-01-27 VITALS
TEMPERATURE: 98.2 F | BODY MASS INDEX: 25.15 KG/M2 | RESPIRATION RATE: 16 BRPM | DIASTOLIC BLOOD PRESSURE: 84 MMHG | HEART RATE: 76 BPM | WEIGHT: 208 LBS | SYSTOLIC BLOOD PRESSURE: 136 MMHG

## 2022-01-27 DIAGNOSIS — E11.9 TYPE 2 DIABETES MELLITUS WITHOUT COMPLICATION, WITHOUT LONG-TERM CURRENT USE OF INSULIN: Primary | ICD-10-CM

## 2022-01-27 DIAGNOSIS — N52.9 ERECTILE DYSFUNCTION, UNSPECIFIED ERECTILE DYSFUNCTION TYPE: ICD-10-CM

## 2022-01-27 DIAGNOSIS — K21.9 GASTROESOPHAGEAL REFLUX DISEASE WITHOUT ESOPHAGITIS: ICD-10-CM

## 2022-01-27 DIAGNOSIS — I10 ESSENTIAL HYPERTENSION: ICD-10-CM

## 2022-01-27 DIAGNOSIS — E78.5 HYPERLIPIDEMIA, UNSPECIFIED HYPERLIPIDEMIA TYPE: ICD-10-CM

## 2022-01-27 DIAGNOSIS — J01.00 ACUTE NON-RECURRENT MAXILLARY SINUSITIS: ICD-10-CM

## 2022-01-27 LAB
ALBUMIN SERPL-MCNC: 4.6 G/DL (ref 3.5–5.2)
ALBUMIN/GLOB SERPL: 2.1 G/DL
ALP SERPL-CCNC: 74 U/L (ref 39–117)
ALT SERPL W P-5'-P-CCNC: 22 U/L (ref 1–41)
ANION GAP SERPL CALCULATED.3IONS-SCNC: 8.5 MMOL/L (ref 5–15)
AST SERPL-CCNC: 33 U/L (ref 1–40)
BILIRUB SERPL-MCNC: 0.8 MG/DL (ref 0–1.2)
BUN SERPL-MCNC: 12 MG/DL (ref 8–23)
BUN/CREAT SERPL: 12.5 (ref 7–25)
CALCIUM SPEC-SCNC: 9.7 MG/DL (ref 8.6–10.5)
CHLORIDE SERPL-SCNC: 101 MMOL/L (ref 98–107)
CHOLEST SERPL-MCNC: 146 MG/DL (ref 0–200)
CO2 SERPL-SCNC: 27.5 MMOL/L (ref 22–29)
CREAT SERPL-MCNC: 0.96 MG/DL (ref 0.76–1.27)
GFR SERPL CREATININE-BSD FRML MDRD: 79 ML/MIN/1.73
GLOBULIN UR ELPH-MCNC: 2.2 GM/DL
GLUCOSE SERPL-MCNC: 117 MG/DL (ref 65–99)
HBA1C MFR BLD: 5.8 % (ref 4.8–5.6)
HDLC SERPL-MCNC: 58 MG/DL (ref 40–60)
LDLC SERPL CALC-MCNC: 75 MG/DL (ref 0–100)
LDLC/HDLC SERPL: 1.29 {RATIO}
POTASSIUM SERPL-SCNC: 4.4 MMOL/L (ref 3.5–5.2)
PROT SERPL-MCNC: 6.8 G/DL (ref 6–8.5)
SARS-COV-2 RNA NOSE QL NAA+PROBE: DETECTED
SODIUM SERPL-SCNC: 137 MMOL/L (ref 136–145)
TRIGL SERPL-MCNC: 66 MG/DL (ref 0–150)
VLDLC SERPL-MCNC: 13 MG/DL (ref 5–40)

## 2022-01-27 PROCEDURE — U0004 COV-19 TEST NON-CDC HGH THRU: HCPCS | Performed by: INTERNAL MEDICINE

## 2022-01-27 PROCEDURE — 99214 OFFICE O/P EST MOD 30 MIN: CPT | Performed by: INTERNAL MEDICINE

## 2022-01-27 PROCEDURE — 83036 HEMOGLOBIN GLYCOSYLATED A1C: CPT | Performed by: INTERNAL MEDICINE

## 2022-01-27 PROCEDURE — 80053 COMPREHEN METABOLIC PANEL: CPT | Performed by: INTERNAL MEDICINE

## 2022-01-27 PROCEDURE — 36415 COLL VENOUS BLD VENIPUNCTURE: CPT | Performed by: INTERNAL MEDICINE

## 2022-01-27 PROCEDURE — 80061 LIPID PANEL: CPT | Performed by: INTERNAL MEDICINE

## 2022-01-27 RX ORDER — TADALAFIL 5 MG/1
5 TABLET ORAL DAILY
Qty: 90 TABLET | Refills: 2 | Status: SHIPPED | OUTPATIENT
Start: 2022-01-27 | End: 2022-03-09 | Stop reason: SDUPTHER

## 2022-01-27 RX ORDER — AMOXICILLIN AND CLAVULANATE POTASSIUM 875; 125 MG/1; MG/1
1 TABLET, FILM COATED ORAL 2 TIMES DAILY
Qty: 20 TABLET | Refills: 0 | Status: SHIPPED | OUTPATIENT
Start: 2022-01-27 | End: 2022-08-04

## 2022-01-27 RX ORDER — UBIDECARENONE 100 MG
100 CAPSULE ORAL DAILY
COMMUNITY

## 2022-01-27 RX ORDER — BENZONATATE 200 MG/1
200 CAPSULE ORAL 3 TIMES DAILY PRN
Qty: 30 CAPSULE | Refills: 0 | Status: SHIPPED | OUTPATIENT
Start: 2022-01-27 | End: 2022-10-17

## 2022-01-27 RX ORDER — METHYLPREDNISOLONE 4 MG/1
TABLET ORAL
Qty: 21 TABLET | Refills: 0 | Status: SHIPPED | OUTPATIENT
Start: 2022-01-27 | End: 2022-08-04

## 2022-01-27 NOTE — PROGRESS NOTES
Chief Complaint   Patient presents with   • Follow-up     6 month follow up chronic medical problems   • sinus issues       History of Present Illness    The patient presents for a follow-up related to Type 2 Diabetes Mellitus. He does not check his blood sugars at home. He denies hypoglycemic symptoms. The patient denies polyuria, polydypsia or polyphagia. He reports no symptoms of neuropathy. The patient takes his medication as prescribed. He is not taking insulin. The patient does check his feet daily at home. He denies chest pain, shortness of breath, orthopnea, paroxysmal nocturnal dyspnea, dyspnea on exertion, edema, palpitations or syncope.    The patient presents for a follow-up related to hypertension. The patient reports that he has had headaches but he denies having blurred vision. He states that he is taking his medication as prescribed. He is not having medication side effects.    The patient presents for a follow-up related to GERD. The patient is not on medication for his gastroesophageal reflux. He reports no abdominal pain, belching, diarrhea, dysphagia, early satiety, heartburn, hoarseness, nausea, odynophagia, rectal bleeding, vomiting or weight loss. The GERD has no known aggravating factors.    The patient reports a 2 day history of upper respiratory symptoms. The patient has symptoms of a dry cough, facial pain, tooth pain, nasal congestion and rhinorrhea but the patient does not have a wet cough, wheezing, fever, eye drainage, ear pain, ear drainage, rash, decreased appetite or sore throat. The nasal discharge is clear. The patient has not tried anything for treatment of this illness.    The patient presents for a follow-up related to hyperlipidemia. He is following a low fat diet. He reports that he is exercising. He is taking simvastatin. The patient is taking his medication as prescribed. He reports no medication side effects, including muscle cramps, abdominal pain, headaches or  weakness.    Review of Systems    CONSTITUTIONAL- Denies Chills, Sweats, Fatigue, Weakness or Malaise.    HENT- Reports: Sinus Pain. Denies: Decreased Hearing or Tinnitus.    GASTROINTESTINAL- Denies Constipation.    PULMONARY- Reports: Cough. Denies: Sputum Production or Hemoptysis.    CARDIOVASCULAR- Denies Irregular Heart Beat.    Medications      Current Outpatient Medications:   •  aspirin (ASPIRIN LOW DOSE) 81 MG tablet, Take  by mouth daily., Disp: , Rfl:   •  coenzyme Q10 100 MG capsule, Take 100 mg by mouth Daily., Disp: , Rfl:   •  fluticasone (FLONASE) 50 MCG/ACT nasal spray, into each nostril Daily As Needed., Disp: , Rfl:   •  lisinopril (PRINIVIL,ZESTRIL) 40 MG tablet, TAKE 1 TABLET DAILY, Disp: 90 tablet, Rfl: 3  •  simvastatin (ZOCOR) 40 MG tablet, TAKE 1 TABLET DAILY, Disp: 90 tablet, Rfl: 3  •  tadalafil (CIALIS) 5 MG tablet, Take 1 tablet by mouth Daily., Disp: 90 tablet, Rfl: 2     Allergies    No Known Allergies    Problem List    Patient Active Problem List   Diagnosis   • Diabetes mellitus (HCC)   • Hyperlipidemia   • Hypertension   • Gastroesophageal reflux disease   • Enlarged prostate without lower urinary tract symptoms (luts)   • Erectile dysfunction of nonorganic origin   • Acute bronchitis   • Atopic rhinitis   • Liver cyst       Medications, Allergies, Problems List and Past History were reviewed and updated.    Physical Examination    /84 (BP Location: Left arm, Patient Position: Sitting, Cuff Size: Adult)   Pulse 76   Temp 98.2 °F (36.8 °C) (Infrared)   Resp 16   Wt 94.3 kg (208 lb)   BMI 25.15 kg/m²     HEENT: Head- Normocephalic Atraumatic. Facies- Within normal limits. Pinnas- Normal texture and shape bilaterally. Canals- Normal bilaterally. TMs- Normal bilaterally. Nares- Patent bilaterally. Nasal Septum- is normal. There is pain to palpation over the right maxillary sinus and left maxillary sinus but not over the right frontal sinus or left frontal sinus. Lids- Normal  bilaterally. Conjunctiva- Clear bilaterally. Sclera- Anicteric bilaterally. Oropharynx- Moist with no lesions. Tonsils- No enlargement, erythema or exudate.    Neck: Thyroid- non enlarged, symmetric and has no nodules. No bruits are detected. ROM- Normal Range of Motion with no rigidity.    Lungs: Auscultation- Clear to auscultation bilaterally. There are no retractions, clubbing or cyanosis. The Expiratory to Inspiratory ratio is equal.    Lymph Nodes: Cervical- no enlarged lymph nodes noted.    Cardiovascular: There are no carotid bruits. Heart- Normal Rate with Regular rhythm and no murmurs. There are no gallops. There are no rubs. In the lower extremities there is no edema. The upper extremities do not have edema.    Abdomen: Soft, benign, non-tender with no masses, hernias, organomegaly or scars.    Impression and Assessment    Type 2 Diabetes Mellitus without complication without insulin usage.    Essential Hypertension.    Gastroesophageal Reflux Disease.    Acute Sinusitis.    Hyperlipidemia.    Plan    Gastroesophageal Reflux Disease Plan: The condition is stable. No change is needed in the current plan.    Essential Hypertension Plan: The patient was instructed to continue the current medications.    Hyperlipidemia Plan: The patient was instructed to exercise daily, eat a low fat diet and continue his medications.    Type 2 Diabetes Mellitus without complication without insulin usage Plan: The patient was instructed to continue the current medications.    Acute Sinusitis Plan: A cool mist humidifier was encouraged. He was encouraged to drink plenty of fluids. Medication will be added as noted below.    Diagnoses and all orders for this visit:    1. Type 2 diabetes mellitus without complication, without long-term current use of insulin (HCC) (Primary)  -     Comprehensive Metabolic Panel; Future  -     Hemoglobin A1c; Future  -     Hemoglobin A1c  -     Comprehensive Metabolic Panel    2. Essential  hypertension  -     Comprehensive Metabolic Panel; Future  -     Comprehensive Metabolic Panel    3. Gastroesophageal reflux disease without esophagitis    4. Hyperlipidemia, unspecified hyperlipidemia type  -     Comprehensive Metabolic Panel; Future  -     Lipid Panel; Future  -     Lipid Panel  -     Comprehensive Metabolic Panel    5. Erectile dysfunction, unspecified erectile dysfunction type  -     tadalafil (CIALIS) 5 MG tablet; Take 1 tablet by mouth Daily.  Dispense: 90 tablet; Refill: 2    6. Acute non-recurrent maxillary sinusitis  -     amoxicillin-clavulanate (Augmentin) 875-125 MG per tablet; Take 1 tablet by mouth 2 (Two) Times a Day.  Dispense: 20 tablet; Refill: 0  -     methylPREDNISolone (MEDROL) 4 MG dose pack; Take as directed on package instructions.  Dispense: 21 tablet; Refill: 0  -     benzonatate (TESSALON) 200 MG capsule; Take 1 capsule by mouth 3 (Three) Times a Day As Needed for Cough.  Dispense: 30 capsule; Refill: 0  -     COVID-19 PCR, [facility], NP SWAB IN [facility]AL TRANSPORT MEDIA/ORAL SWISH 24-30 HR TAT - Swab, Nasopharynx; Future  -     COVID-19 PCR, [facility], NP SWAB IN [facility] V[facility]AL TRANSPORT MEDIA/ORAL SWISH 24-30 HR TAT - Swab, Nasopharynx        Return to Office    The patient was instructed to return for follow-up in 6 months. Next Visit: Follow-up and Physical Examination. The patient was instructed to return sooner if the condition changes, worsens, or does not resolve.

## 2022-02-06 ENCOUNTER — TELEPHONE (OUTPATIENT)
Dept: INTERNAL MEDICINE | Facility: CLINIC | Age: 62
End: 2022-02-06

## 2022-02-07 NOTE — TELEPHONE ENCOUNTER
Orlando Meraz (Key: [id number])  Tadalafil 5MG tablets    Wait for Determination:  Your information has been submitted to [employer] and is being reviewed. You may close this dialog, return to your dashboard, and perform other tasks. You will receive an electronic determination in [employer] within 24-96 hours; you’ll also receive a faxed copy. You can see the latest determination by locating this request on your dashboard or reopening this request. If [employer] has not responded in 96 hours, contact [facility] at [phone].

## 2022-02-07 NOTE — TELEPHONE ENCOUNTER
Regarding: tadalafil   ----- Message from Cassie aYtes MA sent at 2/1/2022  1:30 PM EST -----       ----- Message from Orlando Meraz to Mustapha Yoon MD sent at 2/1/2022 11:47 AM -----   [employer] says that they need a note from you to approve for insurance. As you know, I am interested in the prostrate protection. They told me they have sent a request to you.    Thank You, Td Meraz

## 2022-02-11 NOTE — TELEPHONE ENCOUNTER
Member Not Eligible.Case cannot be completed as the member is ineligible.    Patient has been notified and will call [facility].

## 2022-02-21 NOTE — TELEPHONE ENCOUNTER
Patient called stated he does need a PA for this medication please.  Please contact after PA is completed and sent.  Thank you

## 2022-03-09 DIAGNOSIS — N52.9 ERECTILE DYSFUNCTION, UNSPECIFIED ERECTILE DYSFUNCTION TYPE: ICD-10-CM

## 2022-03-09 RX ORDER — TADALAFIL 5 MG/1
5 TABLET ORAL DAILY
Qty: 30 TABLET | Refills: 5 | Status: SHIPPED | OUTPATIENT
Start: 2022-03-09 | End: 2022-09-22

## 2022-08-04 ENCOUNTER — OFFICE VISIT (OUTPATIENT)
Dept: INTERNAL MEDICINE | Facility: CLINIC | Age: 62
End: 2022-08-04

## 2022-08-04 VITALS
BODY MASS INDEX: 26.28 KG/M2 | TEMPERATURE: 97.7 F | DIASTOLIC BLOOD PRESSURE: 82 MMHG | SYSTOLIC BLOOD PRESSURE: 130 MMHG | OXYGEN SATURATION: 96 % | HEIGHT: 76 IN | RESPIRATION RATE: 16 BRPM | HEART RATE: 58 BPM | WEIGHT: 215.8 LBS

## 2022-08-04 DIAGNOSIS — I10 ESSENTIAL HYPERTENSION: ICD-10-CM

## 2022-08-04 DIAGNOSIS — R73.02 IMPAIRED GLUCOSE TOLERANCE: ICD-10-CM

## 2022-08-04 DIAGNOSIS — K21.9 GASTROESOPHAGEAL REFLUX DISEASE WITHOUT ESOPHAGITIS: ICD-10-CM

## 2022-08-04 DIAGNOSIS — Z12.5 PROSTATE CANCER SCREENING: ICD-10-CM

## 2022-08-04 DIAGNOSIS — E78.5 HYPERLIPIDEMIA, UNSPECIFIED HYPERLIPIDEMIA TYPE: ICD-10-CM

## 2022-08-04 DIAGNOSIS — Z00.00 PHYSICAL EXAM: Primary | ICD-10-CM

## 2022-08-04 LAB
ALBUMIN SERPL-MCNC: 4.8 G/DL (ref 3.5–5.2)
ALBUMIN/CREATININE RATIO, URINE: <30
ALBUMIN/GLOB SERPL: 2.4 G/DL
ALP SERPL-CCNC: 64 U/L (ref 39–117)
ALT SERPL W P-5'-P-CCNC: 23 U/L (ref 1–41)
ANION GAP SERPL CALCULATED.3IONS-SCNC: 12 MMOL/L (ref 5–15)
AST SERPL-CCNC: 32 U/L (ref 1–40)
BASOPHILS # BLD AUTO: 0.03 10*3/MM3 (ref 0–0.2)
BASOPHILS NFR BLD AUTO: 0.5 % (ref 0–1.5)
BILIRUB BLD-MCNC: NEGATIVE MG/DL
BILIRUB SERPL-MCNC: 0.9 MG/DL (ref 0–1.2)
BUN SERPL-MCNC: 13 MG/DL (ref 8–23)
BUN/CREAT SERPL: 13.3 (ref 7–25)
CALCIUM SPEC-SCNC: 9.8 MG/DL (ref 8.6–10.5)
CHLORIDE SERPL-SCNC: 104 MMOL/L (ref 98–107)
CHOLEST SERPL-MCNC: 164 MG/DL (ref 0–200)
CLARITY, POC: CLEAR
CO2 SERPL-SCNC: 25 MMOL/L (ref 22–29)
COLOR UR: YELLOW
CREAT SERPL-MCNC: 0.98 MG/DL (ref 0.76–1.27)
DEPRECATED RDW RBC AUTO: 42.5 FL (ref 37–54)
EGFRCR SERPLBLD CKD-EPI 2021: 86.6 ML/MIN/1.73
EOSINOPHIL # BLD AUTO: 0.08 10*3/MM3 (ref 0–0.4)
EOSINOPHIL NFR BLD AUTO: 1.3 % (ref 0.3–6.2)
ERYTHROCYTE [DISTWIDTH] IN BLOOD BY AUTOMATED COUNT: 12.5 % (ref 12.3–15.4)
EXPIRATION DATE: NORMAL
EXPIRATION DATE: NORMAL
GLOBULIN UR ELPH-MCNC: 2 GM/DL
GLUCOSE SERPL-MCNC: 108 MG/DL (ref 65–99)
GLUCOSE UR STRIP-MCNC: NEGATIVE MG/DL
HBA1C MFR BLD: 6.4 % (ref 4.8–5.6)
HCT VFR BLD AUTO: 42.2 % (ref 37.5–51)
HDLC SERPL-MCNC: 54 MG/DL (ref 40–60)
HGB BLD-MCNC: 14.4 G/DL (ref 13–17.7)
IMM GRANULOCYTES # BLD AUTO: 0.02 10*3/MM3 (ref 0–0.05)
IMM GRANULOCYTES NFR BLD AUTO: 0.3 % (ref 0–0.5)
KETONES UR QL: NEGATIVE
LDLC SERPL CALC-MCNC: 97 MG/DL (ref 0–100)
LDLC/HDLC SERPL: 1.79 {RATIO}
LEUKOCYTE EST, POC: NEGATIVE
LYMPHOCYTES # BLD AUTO: 1.65 10*3/MM3 (ref 0.7–3.1)
LYMPHOCYTES NFR BLD AUTO: 27.7 % (ref 19.6–45.3)
Lab: NORMAL
Lab: NORMAL
MCH RBC QN AUTO: 31.9 PG (ref 26.6–33)
MCHC RBC AUTO-ENTMCNC: 34.1 G/DL (ref 31.5–35.7)
MCV RBC AUTO: 93.4 FL (ref 79–97)
MONOCYTES # BLD AUTO: 0.6 10*3/MM3 (ref 0.1–0.9)
MONOCYTES NFR BLD AUTO: 10.1 % (ref 5–12)
NEUTROPHILS NFR BLD AUTO: 3.57 10*3/MM3 (ref 1.7–7)
NEUTROPHILS NFR BLD AUTO: 60.1 % (ref 42.7–76)
NITRITE UR-MCNC: NEGATIVE MG/ML
NRBC BLD AUTO-RTO: 0 /100 WBC (ref 0–0.2)
PH UR: 5 [PH] (ref 5–8)
PLATELET # BLD AUTO: 171 10*3/MM3 (ref 140–450)
PMV BLD AUTO: 11.2 FL (ref 6–12)
POC CREATININE URINE: 200
POC MICROALBUMIN URINE: 10
POTASSIUM SERPL-SCNC: 4.2 MMOL/L (ref 3.5–5.2)
PROT SERPL-MCNC: 6.8 G/DL (ref 6–8.5)
PROT UR STRIP-MCNC: NEGATIVE MG/DL
PSA SERPL-MCNC: 0.73 NG/ML (ref 0–4)
RBC # BLD AUTO: 4.52 10*6/MM3 (ref 4.14–5.8)
RBC # UR STRIP: NEGATIVE /UL
SODIUM SERPL-SCNC: 141 MMOL/L (ref 136–145)
SP GR UR: 1 (ref 1–1.03)
TRIGL SERPL-MCNC: 66 MG/DL (ref 0–150)
TSH SERPL DL<=0.05 MIU/L-ACNC: 1.47 UIU/ML (ref 0.27–4.2)
UROBILINOGEN UR QL: NORMAL
VLDLC SERPL-MCNC: 13 MG/DL (ref 5–40)
WBC NRBC COR # BLD: 5.95 10*3/MM3 (ref 3.4–10.8)

## 2022-08-04 PROCEDURE — 85025 COMPLETE CBC W/AUTO DIFF WBC: CPT | Performed by: INTERNAL MEDICINE

## 2022-08-04 PROCEDURE — 84443 ASSAY THYROID STIM HORMONE: CPT | Performed by: INTERNAL MEDICINE

## 2022-08-04 PROCEDURE — 36415 COLL VENOUS BLD VENIPUNCTURE: CPT | Performed by: INTERNAL MEDICINE

## 2022-08-04 PROCEDURE — 80061 LIPID PANEL: CPT | Performed by: INTERNAL MEDICINE

## 2022-08-04 PROCEDURE — 81003 URINALYSIS AUTO W/O SCOPE: CPT | Performed by: INTERNAL MEDICINE

## 2022-08-04 PROCEDURE — 99396 PREV VISIT EST AGE 40-64: CPT | Performed by: INTERNAL MEDICINE

## 2022-08-04 PROCEDURE — 82044 UR ALBUMIN SEMIQUANTITATIVE: CPT | Performed by: INTERNAL MEDICINE

## 2022-08-04 PROCEDURE — G0103 PSA SCREENING: HCPCS | Performed by: INTERNAL MEDICINE

## 2022-08-04 PROCEDURE — 83036 HEMOGLOBIN GLYCOSYLATED A1C: CPT | Performed by: INTERNAL MEDICINE

## 2022-08-04 PROCEDURE — 80053 COMPREHEN METABOLIC PANEL: CPT | Performed by: INTERNAL MEDICINE

## 2022-08-04 NOTE — PROGRESS NOTES
Chief Complaint   Patient presents with   • Annual Exam     Pt is fasting       History of Present Illness    The patient presents for an established patient physical examination and health maintenance visit.    The patient presents for a follow-up related to hypertension. The patient reports that he has had no headaches, chest pain, dyspnea, edema, syncope, blurred vision or palpitations. He states that he is taking his medication as prescribed. He is not having medication side effects.    The patient presents for a follow-up related to GERD. The patient is not on medication for his gastroesophageal reflux. He reports no abdominal pain, belching, diarrhea, dysphagia, early satiety, heartburn, hoarseness, nausea, odynophagia, rectal bleeding, vomiting or weight loss. The GERD has no known aggravating factors.    The patient presents for a follow-up related to hyperlipidemia. He is following a low fat diet. He reports that he is exercising. He is taking simvastatin. The patient is taking his medication as prescribed. He reports no medication side effects, including muscle cramps, abdominal pain, headaches or weakness. He denies orthopnea, paroxysmal nocturnal dyspnea or dyspnea on exertion.    The patient presents for a follow-up related to impaired glucose tolerance. He does not check his blood sugars at home. He denies hypoglycemic symptoms. The patient denies polyuria, polydypsia or polyphagia. He reports no symptoms of neuropathy. The patient is not on medication for his impaired glucose tolerance. The patient does check his feet daily at home.    Review of Systems    CONSTITUTIONAL- Denies Fever, Chills, Sweats, Fatigue, Weakness or Malaise.    NECK- Denies Decreased Range of Motion, Stiffness, Thyroid Nodules, Enlarged Lymph Nodes or Goiter.    EYES- Denies Eye Pain, Eye Drainage, Eye Redness, Eye Discharge, Decreased Vision, Visual Disturbance, Diplopia, Visual Loss or Swollen Eyelid.    HENT- Denies Nasal  Discharge, Sore Throat, Ear Pain, Ear Drainage, Sinus Pain, Nasal Congestion, Decreased Hearing or Tinnitus.    PULMONARY- Denies Wheezing, Sputum Production, Cough, Hemoptysis or Pleuritic Chest Pain.    GASTROINTESTINAL- Denies Constipation.    GENITOURINARY- Denies Penile Discharge, Erectile Dysfunction, Testicular Mass, Testicular Pain, Hernia, Nocturia, Decreased Urine Stream, Incomplete Voiding, Urinary Frequency, Urinary Urgency, Dysuria or Hematuria.    CARDIOVASCULAR- Denies Claudication or Irregular Heart Beat.    ENDOCRINE- Denies Cold Intolerance, Depression, Hair Loss, Heat Intolerance, Memory Loss, Sleep Disturbance or Weight Gain.    NEUROLOGIC- Denies Seizures, Confusion or Excessive Daytime Sleepiness.    MUSCULOSKELETAL- Denies Joint Pain, Joint Stiffness, Decreased Range of Motion, Joint Swelling or Erythema of Joints.    INTEGUMENTARY- Denies Rash, Lumps, Sores, Itching, Dryness, Color Change, Changes in Hair, Brittle Nails, Discolored Nails, Thickened Nails, Growths or Alopecia.    Medications      Current Outpatient Medications:   •  aspirin 81 MG tablet, Take  by mouth daily., Disp: , Rfl:   •  coenzyme Q10 100 MG capsule, Take 100 mg by mouth Daily., Disp: , Rfl:   •  fluticasone (FLONASE) 50 MCG/ACT nasal spray, into each nostril Daily As Needed., Disp: , Rfl:   •  lisinopril (PRINIVIL,ZESTRIL) 40 MG tablet, TAKE 1 TABLET DAILY, Disp: 90 tablet, Rfl: 3  •  simvastatin (ZOCOR) 40 MG tablet, TAKE 1 TABLET DAILY, Disp: 90 tablet, Rfl: 3  •  tadalafil (CIALIS) 5 MG tablet, Take 1 tablet by mouth Daily., Disp: 30 tablet, Rfl: 5  •  benzonatate (TESSALON) 200 MG capsule, Take 1 capsule by mouth 3 (Three) Times a Day As Needed for Cough., Disp: 30 capsule, Rfl: 0     Allergies    No Known Allergies    Problem List    Patient Active Problem List   Diagnosis   • Diabetes mellitus (HCC)   • Hyperlipidemia   • Hypertension   • Gastroesophageal reflux disease   • Enlarged prostate without lower urinary  "tract symptoms (luts)   • Erectile dysfunction of nonorganic origin   • Acute bronchitis   • Atopic rhinitis   • Liver cyst       Medications, Allergies, Problems List and Past History were reviewed and updated.    Physical Examination    /82 (BP Location: Left arm, Patient Position: Sitting, Cuff Size: Adult)   Pulse 58   Temp 97.7 °F (36.5 °C) (Infrared)   Resp 16   Ht 193 cm (76\")   Wt 97.9 kg (215 lb 12.8 oz)   SpO2 96%   BMI 26.27 kg/m²     HEENT: Head- Normocephalic Atraumatic. Facies- Within normal limits. Pinnas- Normal texture and shape bilaterally. Canals- Normal bilaterally. TMs- Normal bilaterally. Nares- Patent bilaterally. Nasal Septum- is normal. There is no tenderness to palpation over the frontal or maxillary sinuses. Lids- Normal bilaterally. Conjunctiva- Clear bilaterally. Sclera- Anicteric bilaterally. Oropharynx- Moist with no lesions. Tonsils- No enlargement, erythema or exudate.    Neck: Thyroid- non enlarged, symmetric and has no nodules. No bruits are detected. ROM- Normal Range of Motion with no rigidity.    Lungs: Auscultation- Clear to auscultation bilaterally. There are no retractions, clubbing or cyanosis. The Expiratory to Inspiratory ratio is equal.    Lymph Nodes: Cervical- no enlarged lymph nodes noted. Clavicular- Deferred. Axillary- Deferred. Inguinal- Deferred.    Cardiovascular: There are no carotid bruits. Heart- Normal Rate with Regular rhythm and no murmurs. There are no gallops. There are no rubs. In the lower extremities there is no edema. The upper extremities do not have edema.    Abdomen: Soft, benign, non-tender with no masses, hernias, organomegaly or scars.    Male Genitourinary: The penis is circumcised with testicles found in the scrotum bilaterally. Testicular Size is normal bilaterally. The penis has no anatomic abnormalities.    Rectal: reveals normal external sphincter tone with no external lesions.    Prostate: The prostate gland is symmetric and " smooth with no nodularity.    Feet: The feet are symmetric with normal peter landmarks. There is no tenderness to palpation bilaterally. The feet have normal posterior tibial and dorsalis pedis pulses and normal capillary refill bilaterally. The monofilament examination is normal bilaterally.       The arches are normal bilaterally. There are no skin or nail lesions present. There are no ingrown nails. There are no bunions noted.    Dermatologic: The patient has no worrisome or suspicious skin lesions noted.    Impression and Assessment    Normal Physical Examination.    Encounter for Screening for Malignant Neoplasm of the Prostate.    Essential Hypertension.    Gastroesophageal Reflux Disease.    Hyperlipidemia.    Impaired Glucose Tolerance.    Plan    Gastroesophageal Reflux Disease Plan: The condition is stable. No change is needed in the current plan.    Essential Hypertension Plan: The patient was instructed to continue the current medications.    Hyperlipidemia Plan: The patient was instructed to exercise daily, eat a low fat diet and continue his medications.    Impaired Glucose Tolerance Plan: Further plans will be formulated after test results are reviewed.    Counseling was provided regarding: Adequate Aerobic Exercise, Dental Visits, Flossing Teeth, Heart Healthy Diet and Seat Belt Utilization.    The following was ordered for screening and health maintenance: Cardiac Calcium CT Scan and PSA.       Immunizations Ordered and Administered: None.    Diagnoses and all orders for this visit:    1. Physical exam (Primary)  -     TSH; Future  -     CT Cardiac Calcium Score Without Dye; Future    2. Essential hypertension  -     CBC & Differential; Future  -     Comprehensive Metabolic Panel; Future  -     POC Urinalysis Dipstick, Automated; Future    3. Gastroesophageal reflux disease without esophagitis    4. Hyperlipidemia, unspecified hyperlipidemia type  -     Comprehensive Metabolic Panel; Future  -      Lipid Panel; Future    5. Impaired glucose tolerance  -     Comprehensive Metabolic Panel; Future  -     Hemoglobin A1c; Future  -     POC Microalbumin; Future    6. Prostate cancer screening  -     PSA Screen; Future        Return to Office    The patient was instructed to return for follow-up in 6 months. The patient was instructed to return sooner if the condition changes, worsens, or does not resolve.

## 2022-08-20 ENCOUNTER — HOSPITAL ENCOUNTER (OUTPATIENT)
Dept: CT IMAGING | Facility: HOSPITAL | Age: 62
Discharge: HOME | End: 2022-08-20
Admitting: INTERNAL MEDICINE

## 2022-08-20 DIAGNOSIS — Z00.00 PHYSICAL EXAM: ICD-10-CM

## 2022-08-20 PROCEDURE — 75571 CT HRT W/O DYE W/CA TEST: CPT

## 2022-08-28 DIAGNOSIS — R93.1 ABNORMAL SCREENING CARDIAC CT: Primary | ICD-10-CM

## 2022-09-15 RX ORDER — LISINOPRIL 40 MG/1
TABLET ORAL
Qty: 90 TABLET | Refills: 1 | Status: SHIPPED | OUTPATIENT
Start: 2022-09-15 | End: 2023-03-14

## 2022-09-15 RX ORDER — SIMVASTATIN 40 MG
TABLET ORAL
Qty: 90 TABLET | Refills: 1 | Status: SHIPPED | OUTPATIENT
Start: 2022-09-15 | End: 2022-10-17 | Stop reason: ALTCHOICE

## 2022-09-22 DIAGNOSIS — N52.9 ERECTILE DYSFUNCTION, UNSPECIFIED ERECTILE DYSFUNCTION TYPE: ICD-10-CM

## 2022-09-22 RX ORDER — TADALAFIL 5 MG/1
TABLET ORAL
Qty: 30 TABLET | Refills: 5 | Status: SHIPPED | OUTPATIENT
Start: 2022-09-22 | End: 2023-03-06

## 2022-10-17 ENCOUNTER — OFFICE VISIT (OUTPATIENT)
Dept: CARDIOLOGY | Facility: CLINIC | Age: 62
End: 2022-10-17

## 2022-10-17 VITALS
BODY MASS INDEX: 27.03 KG/M2 | DIASTOLIC BLOOD PRESSURE: 82 MMHG | OXYGEN SATURATION: 95 % | HEIGHT: 76 IN | HEART RATE: 81 BPM | SYSTOLIC BLOOD PRESSURE: 124 MMHG | WEIGHT: 222 LBS

## 2022-10-17 DIAGNOSIS — R93.1 ELEVATED CORONARY ARTERY CALCIUM SCORE: Primary | ICD-10-CM

## 2022-10-17 PROCEDURE — 93000 ELECTROCARDIOGRAM COMPLETE: CPT | Performed by: INTERNAL MEDICINE

## 2022-10-17 PROCEDURE — 99244 OFF/OP CNSLTJ NEW/EST MOD 40: CPT | Performed by: INTERNAL MEDICINE

## 2022-10-17 RX ORDER — ROSUVASTATIN CALCIUM 20 MG/1
20 TABLET, COATED ORAL DAILY
Qty: 90 TABLET | Refills: 3 | Status: SHIPPED | OUTPATIENT
Start: 2022-10-17

## 2022-10-17 NOTE — PROGRESS NOTES
Subjective:     Encounter Date:10/17/2022    Primary Care Physician: Mustapha Yoon MD      Patient ID: Orlando Meraz is a 62 y.o. male.    Chief Complaint:ABN CT and Shortness of Breath    PROBLEM LIST:  1. Abnormal calcium score  a. 8/2022 calcium score 233.7 (LAD 84.6, .9, RCA 10.1)  2. Hypertension  3. Dyslipidemia  4. GERD  5. Surgeries:  a. Vasectomy       No Known Allergies      Current Outpatient Medications:   •  aspirin 81 MG tablet, Take 1 tablet by mouth Daily., Disp: , Rfl:   •  coenzyme Q10 100 MG capsule, Take 100 mg by mouth Daily., Disp: , Rfl:   •  fluticasone (FLONASE) 50 MCG/ACT nasal spray, into each nostril Daily As Needed., Disp: , Rfl:   •  lisinopril (PRINIVIL,ZESTRIL) 40 MG tablet, TAKE 1 TABLET DAILY, Disp: 90 tablet, Rfl: 1  •  simvastatin (ZOCOR) 40 MG tablet, TAKE 1 TABLET DAILY, Disp: 90 tablet, Rfl: 1  •  tadalafil (CIALIS) 5 MG tablet, TAKE ONE TABLET BY MOUTH DAILY, Disp: 30 tablet, Rfl: 5        History of Present Illness    Patient is a 62-year-old  male who is being seen today for further evaluation of elevated coronary calcium score.  Patient has no previous history of obstructive coronary disease.  Patient notes to be very active.  Notes that he walked 5 miles this morning without any anginal symptoms.  Typically does not have any chest pain, shortness of breath, syncope, near-syncope, or edema.  Denies any orthopnea.  Is compliant with his medications.  Notes that his calcium score was performed for routine monitoring.    The following portions of the patient's history were reviewed and updated as appropriate: allergies, current medications, past family history, past medical history, past social history, past surgical history and problem list.    Family History   Problem Relation Age of Onset   • Diabetes Father    • Heart disease Father    • Heart disease Mother        Social History     Tobacco Use   • Smoking status: Never   • Smokeless tobacco:  "Never   Vaping Use   • Vaping Use: Never used   Substance Use Topics   • Alcohol use: No   • Drug use: No         Review of Systems   Constitutional: Negative for fever and malaise/fatigue.   HENT: Negative for nosebleeds.    Eyes: Negative for redness and visual disturbance.   Cardiovascular: Negative for orthopnea, palpitations and paroxysmal nocturnal dyspnea.   Respiratory: Negative for cough, snoring, sputum production and wheezing.    Hematologic/Lymphatic: Negative for bleeding problem.   Skin: Negative for flushing, itching and rash.   Musculoskeletal: Negative for falls, joint pain and muscle cramps.   Gastrointestinal: Positive for heartburn. Negative for abdominal pain, diarrhea, nausea and vomiting.   Genitourinary: Negative for hematuria.   Neurological: Negative for excessive daytime sleepiness, dizziness, headaches, tremors and weakness.   Psychiatric/Behavioral: Negative for substance abuse. The patient is not nervous/anxious.           Objective:   /82 (BP Location: Right arm, Patient Position: Sitting)   Pulse 81   Ht 193 cm (76\")   Wt 101 kg (222 lb)   SpO2 95%   BMI 27.02 kg/m²         Vitals reviewed.   Constitutional:       Appearance: Well-developed and not in distress.   Neck:      Thyroid: No thyromegaly.      Vascular: No carotid bruit or JVD.   Pulmonary:      Breath sounds: Normal breath sounds.   Cardiovascular:      Regular rhythm.      No gallop. No S3 and S4 gallop.   Abdominal:      General: Bowel sounds are normal.      Palpations: Abdomen is soft. There is no abdominal mass.      Tenderness: There is no abdominal tenderness.   Musculoskeletal:         General: No deformity.      Extremities: No clubbing present.Skin:     General: Skin is warm and dry.      Findings: No rash.   Neurological:      Mental Status: Alert and oriented to person, place, and time.           ECG 12 Lead    Date/Time: 10/17/2022 1:24 PM  Performed by: Federico Vásquez MD  Authorized by: Fahad, " MD Federico   Comparison: not compared with previous ECG   Previous ECG: no previous ECG available  Rhythm: sinus rhythm  Ectopy: atrial premature contractions                  Assessment:   Assessment & Plan      Diagnoses and all orders for this visit:    1. Elevated coronary artery calcium score (Primary)  -     ECG 12 Lead      1.  Coronary artery disease.  Elevated calcium score.  Active no symptoms.  Walks 5 miles daily.  2.  Dyslipidemia last LDL 97  3.  Hypertension, well controlled on lisinopril    Recommendations:  1.  Discussed risk of acute ischemia syndrome, given his moderately elevated calcium score of 233.  He is very active functional class V, therefore does not need ischemic evaluation.  2.  Discontinue simvastatin  3.  Start rosuvastatin 20 mg nightly  4.  Recheck fasting lipid profile through primary care physician's office in 3 months.  5.  Revisit annually apparent symptom change       Sandy ARREAGA scribed portions of this dictation for Dr. Federico Vásquez.   I have seen and examined the patient, I have reviewed the note, discussed the case with the advance practice clinician, made necessary changes and I agree with the final note.    Federico Vásquez MD  10/17/22  16:48 EDT        Dictated utilizing Dragon dictation

## 2022-11-11 ENCOUNTER — FLU SHOT (OUTPATIENT)
Dept: INTERNAL MEDICINE | Facility: CLINIC | Age: 62
End: 2022-11-11

## 2022-11-11 DIAGNOSIS — Z23 NEEDS FLU SHOT: Primary | ICD-10-CM

## 2022-11-11 PROCEDURE — 90471 IMMUNIZATION ADMIN: CPT | Performed by: INTERNAL MEDICINE

## 2022-11-11 PROCEDURE — 90686 IIV4 VACC NO PRSV 0.5 ML IM: CPT | Performed by: INTERNAL MEDICINE

## 2023-03-01 ENCOUNTER — OFFICE VISIT (OUTPATIENT)
Dept: INTERNAL MEDICINE | Facility: CLINIC | Age: 63
End: 2023-03-01
Payer: COMMERCIAL

## 2023-03-01 VITALS
HEIGHT: 76 IN | SYSTOLIC BLOOD PRESSURE: 124 MMHG | OXYGEN SATURATION: 97 % | WEIGHT: 219.4 LBS | DIASTOLIC BLOOD PRESSURE: 78 MMHG | BODY MASS INDEX: 26.72 KG/M2 | HEART RATE: 85 BPM | TEMPERATURE: 97.3 F

## 2023-03-01 DIAGNOSIS — E78.5 HYPERLIPIDEMIA, UNSPECIFIED HYPERLIPIDEMIA TYPE: Primary | ICD-10-CM

## 2023-03-01 DIAGNOSIS — I10 ESSENTIAL HYPERTENSION: ICD-10-CM

## 2023-03-01 DIAGNOSIS — R73.02 IMPAIRED GLUCOSE TOLERANCE: ICD-10-CM

## 2023-03-01 DIAGNOSIS — R53.83 FATIGUE, UNSPECIFIED TYPE: ICD-10-CM

## 2023-03-01 DIAGNOSIS — K21.9 GASTROESOPHAGEAL REFLUX DISEASE WITHOUT ESOPHAGITIS: ICD-10-CM

## 2023-03-01 LAB
ALBUMIN SERPL-MCNC: 4.6 G/DL (ref 3.5–5.2)
ALBUMIN/GLOB SERPL: 2 G/DL
ALP SERPL-CCNC: 62 U/L (ref 39–117)
ALT SERPL W P-5'-P-CCNC: 31 U/L (ref 1–41)
ANION GAP SERPL CALCULATED.3IONS-SCNC: 8.9 MMOL/L (ref 5–15)
AST SERPL-CCNC: 31 U/L (ref 1–40)
BASOPHILS # BLD AUTO: 0.05 10*3/MM3 (ref 0–0.2)
BASOPHILS NFR BLD AUTO: 0.9 % (ref 0–1.5)
BILIRUB SERPL-MCNC: 0.9 MG/DL (ref 0–1.2)
BUN SERPL-MCNC: 13 MG/DL (ref 8–23)
BUN/CREAT SERPL: 11.8 (ref 7–25)
CALCIUM SPEC-SCNC: 10.1 MG/DL (ref 8.6–10.5)
CHLORIDE SERPL-SCNC: 107 MMOL/L (ref 98–107)
CHOLEST SERPL-MCNC: 131 MG/DL (ref 0–200)
CO2 SERPL-SCNC: 27.1 MMOL/L (ref 22–29)
CREAT SERPL-MCNC: 1.1 MG/DL (ref 0.76–1.27)
DEPRECATED RDW RBC AUTO: 41.7 FL (ref 37–54)
EGFRCR SERPLBLD CKD-EPI 2021: 75 ML/MIN/1.73
EOSINOPHIL # BLD AUTO: 0.11 10*3/MM3 (ref 0–0.4)
EOSINOPHIL NFR BLD AUTO: 2.1 % (ref 0.3–6.2)
ERYTHROCYTE [DISTWIDTH] IN BLOOD BY AUTOMATED COUNT: 12.6 % (ref 12.3–15.4)
GLOBULIN UR ELPH-MCNC: 2.3 GM/DL
GLUCOSE SERPL-MCNC: 145 MG/DL (ref 65–99)
HBA1C MFR BLD: 6.6 % (ref 4.8–5.6)
HCT VFR BLD AUTO: 42.9 % (ref 37.5–51)
HDLC SERPL-MCNC: 49 MG/DL (ref 40–60)
HGB BLD-MCNC: 14.5 G/DL (ref 13–17.7)
IMM GRANULOCYTES # BLD AUTO: 0.01 10*3/MM3 (ref 0–0.05)
IMM GRANULOCYTES NFR BLD AUTO: 0.2 % (ref 0–0.5)
LDLC SERPL CALC-MCNC: 64 MG/DL (ref 0–100)
LDLC/HDLC SERPL: 1.29 {RATIO}
LYMPHOCYTES # BLD AUTO: 1.48 10*3/MM3 (ref 0.7–3.1)
LYMPHOCYTES NFR BLD AUTO: 27.6 % (ref 19.6–45.3)
MCH RBC QN AUTO: 31 PG (ref 26.6–33)
MCHC RBC AUTO-ENTMCNC: 33.8 G/DL (ref 31.5–35.7)
MCV RBC AUTO: 91.9 FL (ref 79–97)
MONOCYTES # BLD AUTO: 0.46 10*3/MM3 (ref 0.1–0.9)
MONOCYTES NFR BLD AUTO: 8.6 % (ref 5–12)
NEUTROPHILS NFR BLD AUTO: 3.25 10*3/MM3 (ref 1.7–7)
NEUTROPHILS NFR BLD AUTO: 60.6 % (ref 42.7–76)
NRBC BLD AUTO-RTO: 0 /100 WBC (ref 0–0.2)
PLATELET # BLD AUTO: 195 10*3/MM3 (ref 140–450)
PMV BLD AUTO: 11.1 FL (ref 6–12)
POTASSIUM SERPL-SCNC: 4.1 MMOL/L (ref 3.5–5.2)
PROT SERPL-MCNC: 6.9 G/DL (ref 6–8.5)
RBC # BLD AUTO: 4.67 10*6/MM3 (ref 4.14–5.8)
SODIUM SERPL-SCNC: 143 MMOL/L (ref 136–145)
TRIGL SERPL-MCNC: 93 MG/DL (ref 0–150)
TSH SERPL DL<=0.05 MIU/L-ACNC: 1.39 UIU/ML (ref 0.27–4.2)
VLDLC SERPL-MCNC: 18 MG/DL (ref 5–40)
WBC NRBC COR # BLD: 5.36 10*3/MM3 (ref 3.4–10.8)

## 2023-03-01 PROCEDURE — 80053 COMPREHEN METABOLIC PANEL: CPT | Performed by: INTERNAL MEDICINE

## 2023-03-01 PROCEDURE — 84403 ASSAY OF TOTAL TESTOSTERONE: CPT | Performed by: INTERNAL MEDICINE

## 2023-03-01 PROCEDURE — 80061 LIPID PANEL: CPT | Performed by: INTERNAL MEDICINE

## 2023-03-01 PROCEDURE — 84402 ASSAY OF FREE TESTOSTERONE: CPT | Performed by: INTERNAL MEDICINE

## 2023-03-01 PROCEDURE — 99214 OFFICE O/P EST MOD 30 MIN: CPT | Performed by: INTERNAL MEDICINE

## 2023-03-01 PROCEDURE — 85025 COMPLETE CBC W/AUTO DIFF WBC: CPT | Performed by: INTERNAL MEDICINE

## 2023-03-01 PROCEDURE — 36415 COLL VENOUS BLD VENIPUNCTURE: CPT | Performed by: INTERNAL MEDICINE

## 2023-03-01 PROCEDURE — 84443 ASSAY THYROID STIM HORMONE: CPT | Performed by: INTERNAL MEDICINE

## 2023-03-01 PROCEDURE — 83036 HEMOGLOBIN GLYCOSYLATED A1C: CPT | Performed by: INTERNAL MEDICINE

## 2023-03-01 NOTE — PROGRESS NOTES
Chief Complaint   Patient presents with   • Follow-up     6 month f/u.        History of Present Illness    The patient presents for a follow-up related to hypertension. The patient reports that he has had no headaches, chest pain, dyspnea, edema, syncope, blurred vision or palpitations. He states that he is taking his medication as prescribed. He is not having medication side effects.    The patient presents for a follow-up related to GERD. The patient is not on medication for his gastroesophageal reflux. He reports no abdominal pain, belching, diarrhea, dysphagia, early satiety, heartburn, hoarseness, nausea, odynophagia, rectal bleeding, vomiting or weight loss. The GERD has no known aggravating factors.    The patient presents for a follow-up related to hyperlipidemia. He is following a low fat diet. He reports that he is exercising. He is taking rosuvastatin. The patient is taking his medication as prescribed. He reports no medication side effects, including muscle cramps, abdominal pain, headaches or weakness. He denies orthopnea, paroxysmal nocturnal dyspnea or dyspnea on exertion.    The patient presents for a follow-up related to impaired glucose tolerance. He does not check his blood sugars at home. He denies hypoglycemic symptoms. The patient denies polyuria, polydypsia or polyphagia. He reports no symptoms of neuropathy. The patient is not on medication for his impaired glucose tolerance. The patient does check his feet daily at home.    The patient reports that his fatigue is stable. He denies tearfulness, lonliness, hoplessness, anxiety, suicidal thoughts or depression. The patient reports no dry cough, wet cough, wheezing, fever, facial pain, eye drainage, ear pain, ear drainage, myalgias, sore throat, nasal discharge, decreased appetite, chills, night sweats or joint pain. There are no symptoms of heat intolerance, cold intolerance, excessive hair loss or constipation. He does exercise  "regularly.    Medications      Current Outpatient Medications:   •  aspirin 81 MG tablet, Take 1 tablet by mouth Daily., Disp: , Rfl:   •  coenzyme Q10 100 MG capsule, Take 1 capsule by mouth Daily., Disp: , Rfl:   •  fluticasone (FLONASE) 50 MCG/ACT nasal spray, into each nostril Daily As Needed., Disp: , Rfl:   •  lisinopril (PRINIVIL,ZESTRIL) 40 MG tablet, TAKE 1 TABLET DAILY, Disp: 90 tablet, Rfl: 1  •  rosuvastatin (CRESTOR) 20 MG tablet, Take 1 tablet by mouth Daily., Disp: 90 tablet, Rfl: 3  •  tadalafil (CIALIS) 5 MG tablet, TAKE ONE TABLET BY MOUTH DAILY, Disp: 30 tablet, Rfl: 5     Allergies    No Known Allergies    Problem List    Patient Active Problem List   Diagnosis   • Diabetes mellitus (HCC)   • Hyperlipidemia   • Hypertension   • Gastroesophageal reflux disease   • Enlarged prostate without lower urinary tract symptoms (luts)   • Erectile dysfunction of nonorganic origin   • Acute bronchitis   • Atopic rhinitis   • Liver cyst       Medications, Allergies, Problems List and Past History were reviewed and updated.    Physical Examination    /78   Pulse 85   Temp 97.3 °F (36.3 °C) (Infrared)   Ht 193 cm (75.98\")   Wt 99.5 kg (219 lb 6.4 oz)   SpO2 97%   BMI 26.72 kg/m²     HEENT: Head- Normocephalic Atraumatic. Facies- Within normal limits. Pinnas- Normal texture and shape bilaterally. Lids- Normal bilaterally. Conjunctiva- Clear bilaterally. Sclera- Anicteric bilaterally. Oropharynx- Moist with no lesions. Tonsils- No enlargement, erythema or exudate.    Neck: Thyroid- non enlarged, symmetric and has no nodules. No bruits are detected. ROM- Normal Range of Motion with no rigidity.    Lungs: Auscultation- Clear to auscultation bilaterally. There are no retractions, clubbing or cyanosis. The Expiratory to Inspiratory ratio is equal.    Lymph Nodes: Cervical- no enlarged lymph nodes noted. Clavicular- Deferred. Axillary- Deferred. Inguinal- Deferred.    Cardiovascular: There are no carotid " bruits. Heart- Normal Rate with Regular rhythm and no murmurs. There are no gallops. There are no rubs. In the lower extremities there is no edema. The upper extremities do not have edema.    Abdomen: Soft, benign, non-tender with no masses, hernias, organomegaly or scars.      Impression and Assessment    Essential Hypertension.    Gastroesophageal Reflux Disease.    Hyperlipidemia.    Impaired Glucose Tolerance.    Fatigue.    Plan    Gastroesophageal Reflux Disease Plan: The patient was instructed to continue the current medications.    Essential Hypertension Plan: The patient was instructed to continue the current medications.    Hyperlipidemia Plan: The patient was instructed to exercise daily, eat a low fat diet and continue his medications.    Impaired Glucose Tolerance Plan: Further plans will be formulated after test results are reviewed.    Fatigue Plan: Further plans will be made after test results are received and reviewed.    Diagnoses and all orders for this visit:    1. Hyperlipidemia, unspecified hyperlipidemia type (Primary)  -     Comprehensive Metabolic Panel; Future  -     Lipid Panel; Future  -     Comprehensive Metabolic Panel  -     Lipid Panel    2. Essential hypertension  -     CBC & Differential; Future  -     Comprehensive Metabolic Panel; Future  -     CBC & Differential  -     Comprehensive Metabolic Panel    3. Impaired glucose tolerance  -     Comprehensive Metabolic Panel; Future  -     Hemoglobin A1c; Future  -     Comprehensive Metabolic Panel  -     Hemoglobin A1c    4. Gastroesophageal reflux disease without esophagitis    5. Fatigue, unspecified type  -     CBC & Differential; Future  -     Comprehensive Metabolic Panel; Future  -     TSH; Future  -     Testosterone, Free, Total; Future  -     CBC & Differential  -     Comprehensive Metabolic Panel  -     TSH  -     Testosterone, Free, Total        Return to Office    The patient was instructed to return for follow-up in 6  months. The patient was instructed to return sooner if the condition changes, worsens, or does not resolve.

## 2023-03-06 ENCOUNTER — TELEPHONE (OUTPATIENT)
Dept: INTERNAL MEDICINE | Facility: CLINIC | Age: 63
End: 2023-03-06
Payer: COMMERCIAL

## 2023-03-06 DIAGNOSIS — N52.9 ERECTILE DYSFUNCTION, UNSPECIFIED ERECTILE DYSFUNCTION TYPE: ICD-10-CM

## 2023-03-06 RX ORDER — TADALAFIL 5 MG/1
TABLET ORAL
Qty: 30 TABLET | Refills: 5 | Status: SHIPPED | OUTPATIENT
Start: 2023-03-06

## 2023-03-06 NOTE — TELEPHONE ENCOUNTER
----- Message from Mustapha Yoon MD sent at 3/5/2023  6:59 PM EST -----  Please call the patient regarding his MRI result.  It is normal. This is good news.

## 2023-03-06 NOTE — TELEPHONE ENCOUNTER
Patient return call, informed that Dr Yoon said to let him know his MRI is normal and that this is good news.  Verbalized good understanding, agreement and appreciation.

## 2023-03-07 ENCOUNTER — TELEPHONE (OUTPATIENT)
Dept: INTERNAL MEDICINE | Facility: CLINIC | Age: 63
End: 2023-03-07
Payer: COMMERCIAL

## 2023-03-07 LAB
TESTOST FREE SERPL-MCNC: 5.5 PG/ML (ref 6.6–18.1)
TESTOST SERPL-MCNC: 411 NG/DL (ref 264–916)

## 2023-03-07 NOTE — TELEPHONE ENCOUNTER
----- Message from Mustapha Yoon MD sent at 3/7/2023  7:49 AM EST -----  Please call the patient regarding his abnormal result.  His testosterone levels are slightly decreased.  We will need to recheck the levels in 3-4 months prior to considering treatment.

## 2023-03-07 NOTE — TELEPHONE ENCOUNTER
Patient called back, transfer was not successful, I called patient back and notified him of results.

## 2023-03-14 RX ORDER — LISINOPRIL 40 MG/1
TABLET ORAL
Qty: 90 TABLET | Refills: 3 | Status: SHIPPED | OUTPATIENT
Start: 2023-03-14

## 2023-05-23 RX ORDER — LISINOPRIL 40 MG/1
40 TABLET ORAL DAILY
Qty: 90 TABLET | Refills: 1 | Status: SHIPPED | OUTPATIENT
Start: 2023-05-23

## 2023-08-12 DIAGNOSIS — N52.9 ERECTILE DYSFUNCTION, UNSPECIFIED ERECTILE DYSFUNCTION TYPE: ICD-10-CM

## 2023-08-14 RX ORDER — TADALAFIL 5 MG/1
TABLET ORAL
Qty: 30 TABLET | Refills: 5 | Status: SHIPPED | OUTPATIENT
Start: 2023-08-14

## 2023-08-17 ENCOUNTER — OFFICE VISIT (OUTPATIENT)
Dept: INTERNAL MEDICINE | Facility: CLINIC | Age: 63
End: 2023-08-17
Payer: COMMERCIAL

## 2023-08-17 VITALS
SYSTOLIC BLOOD PRESSURE: 128 MMHG | RESPIRATION RATE: 18 BRPM | BODY MASS INDEX: 27.4 KG/M2 | DIASTOLIC BLOOD PRESSURE: 68 MMHG | HEIGHT: 76 IN | WEIGHT: 225 LBS | HEART RATE: 56 BPM | TEMPERATURE: 98 F

## 2023-08-17 DIAGNOSIS — K21.9 GASTROESOPHAGEAL REFLUX DISEASE WITHOUT ESOPHAGITIS: ICD-10-CM

## 2023-08-17 DIAGNOSIS — Z00.00 PHYSICAL EXAM: Primary | ICD-10-CM

## 2023-08-17 DIAGNOSIS — R73.02 IMPAIRED GLUCOSE TOLERANCE: ICD-10-CM

## 2023-08-17 DIAGNOSIS — I10 ESSENTIAL HYPERTENSION: ICD-10-CM

## 2023-08-17 DIAGNOSIS — E78.5 HYPERLIPIDEMIA, UNSPECIFIED HYPERLIPIDEMIA TYPE: ICD-10-CM

## 2023-08-17 DIAGNOSIS — E29.1 HYPOGONADISM IN MALE: ICD-10-CM

## 2023-08-17 DIAGNOSIS — Z12.5 PROSTATE CANCER SCREENING: ICD-10-CM

## 2023-08-17 LAB
ALBUMIN SERPL-MCNC: 4.5 G/DL (ref 3.5–5.2)
ALBUMIN/CREATININE RATIO, URINE: <30
ALBUMIN/GLOB SERPL: 2 G/DL
ALP SERPL-CCNC: 67 U/L (ref 39–117)
ALT SERPL W P-5'-P-CCNC: 22 U/L (ref 1–41)
ANION GAP SERPL CALCULATED.3IONS-SCNC: 11.4 MMOL/L (ref 5–15)
AST SERPL-CCNC: 30 U/L (ref 1–40)
BASOPHILS # BLD AUTO: 0.03 10*3/MM3 (ref 0–0.2)
BASOPHILS NFR BLD AUTO: 0.6 % (ref 0–1.5)
BILIRUB BLD-MCNC: NEGATIVE MG/DL
BILIRUB SERPL-MCNC: 0.9 MG/DL (ref 0–1.2)
BUN SERPL-MCNC: 13 MG/DL (ref 8–23)
BUN/CREAT SERPL: 14 (ref 7–25)
CALCIUM SPEC-SCNC: 9.4 MG/DL (ref 8.6–10.5)
CHLORIDE SERPL-SCNC: 102 MMOL/L (ref 98–107)
CHOLEST SERPL-MCNC: 143 MG/DL (ref 0–200)
CLARITY, POC: CLEAR
CO2 SERPL-SCNC: 26.6 MMOL/L (ref 22–29)
COLOR UR: YELLOW
CREAT SERPL-MCNC: 0.93 MG/DL (ref 0.76–1.27)
DEPRECATED RDW RBC AUTO: 42.8 FL (ref 37–54)
EGFRCR SERPLBLD CKD-EPI 2021: 91.7 ML/MIN/1.73
EOSINOPHIL # BLD AUTO: 0.2 10*3/MM3 (ref 0–0.4)
EOSINOPHIL NFR BLD AUTO: 3.7 % (ref 0.3–6.2)
ERYTHROCYTE [DISTWIDTH] IN BLOOD BY AUTOMATED COUNT: 12.7 % (ref 12.3–15.4)
EXPIRATION DATE: NORMAL
EXPIRATION DATE: NORMAL
GLOBULIN UR ELPH-MCNC: 2.3 GM/DL
GLUCOSE SERPL-MCNC: 120 MG/DL (ref 65–99)
GLUCOSE UR STRIP-MCNC: NEGATIVE MG/DL
HBA1C MFR BLD: 7.5 % (ref 4.8–5.6)
HCT VFR BLD AUTO: 41.4 % (ref 37.5–51)
HDLC SERPL-MCNC: 44 MG/DL (ref 40–60)
HGB BLD-MCNC: 14.2 G/DL (ref 13–17.7)
IMM GRANULOCYTES # BLD AUTO: 0.02 10*3/MM3 (ref 0–0.05)
IMM GRANULOCYTES NFR BLD AUTO: 0.4 % (ref 0–0.5)
KETONES UR QL: NEGATIVE
LDLC SERPL CALC-MCNC: 79 MG/DL (ref 0–100)
LDLC/HDLC SERPL: 1.75 {RATIO}
LEUKOCYTE EST, POC: NEGATIVE
LYMPHOCYTES # BLD AUTO: 1.51 10*3/MM3 (ref 0.7–3.1)
LYMPHOCYTES NFR BLD AUTO: 28.2 % (ref 19.6–45.3)
Lab: NORMAL
Lab: NORMAL
MCH RBC QN AUTO: 31.6 PG (ref 26.6–33)
MCHC RBC AUTO-ENTMCNC: 34.3 G/DL (ref 31.5–35.7)
MCV RBC AUTO: 92.2 FL (ref 79–97)
MONOCYTES # BLD AUTO: 0.52 10*3/MM3 (ref 0.1–0.9)
MONOCYTES NFR BLD AUTO: 9.7 % (ref 5–12)
NEUTROPHILS NFR BLD AUTO: 3.08 10*3/MM3 (ref 1.7–7)
NEUTROPHILS NFR BLD AUTO: 57.4 % (ref 42.7–76)
NITRITE UR-MCNC: NEGATIVE MG/ML
NRBC BLD AUTO-RTO: 0 /100 WBC (ref 0–0.2)
PH UR: 6 [PH] (ref 5–8)
PLATELET # BLD AUTO: 213 10*3/MM3 (ref 140–450)
PMV BLD AUTO: 11 FL (ref 6–12)
POC CREATININE URINE: 100
POC MICROALBUMIN URINE: 10
POTASSIUM SERPL-SCNC: 4.3 MMOL/L (ref 3.5–5.2)
PROT SERPL-MCNC: 6.8 G/DL (ref 6–8.5)
PROT UR STRIP-MCNC: NEGATIVE MG/DL
PSA SERPL-MCNC: 0.91 NG/ML (ref 0–4)
RBC # BLD AUTO: 4.49 10*6/MM3 (ref 4.14–5.8)
RBC # UR STRIP: NEGATIVE /UL
SODIUM SERPL-SCNC: 140 MMOL/L (ref 136–145)
SP GR UR: 1.01 (ref 1–1.03)
TRIGL SERPL-MCNC: 111 MG/DL (ref 0–150)
TSH SERPL DL<=0.05 MIU/L-ACNC: 1.17 UIU/ML (ref 0.27–4.2)
UROBILINOGEN UR QL: NORMAL
VLDLC SERPL-MCNC: 20 MG/DL (ref 5–40)
WBC NRBC COR # BLD: 5.36 10*3/MM3 (ref 3.4–10.8)

## 2023-08-17 PROCEDURE — 85025 COMPLETE CBC W/AUTO DIFF WBC: CPT | Performed by: INTERNAL MEDICINE

## 2023-08-17 PROCEDURE — 84403 ASSAY OF TOTAL TESTOSTERONE: CPT | Performed by: INTERNAL MEDICINE

## 2023-08-17 PROCEDURE — G0103 PSA SCREENING: HCPCS | Performed by: INTERNAL MEDICINE

## 2023-08-17 PROCEDURE — 84402 ASSAY OF FREE TESTOSTERONE: CPT | Performed by: INTERNAL MEDICINE

## 2023-08-17 PROCEDURE — 80053 COMPREHEN METABOLIC PANEL: CPT | Performed by: INTERNAL MEDICINE

## 2023-08-17 PROCEDURE — 84443 ASSAY THYROID STIM HORMONE: CPT | Performed by: INTERNAL MEDICINE

## 2023-08-17 PROCEDURE — 80061 LIPID PANEL: CPT | Performed by: INTERNAL MEDICINE

## 2023-08-17 PROCEDURE — 83036 HEMOGLOBIN GLYCOSYLATED A1C: CPT | Performed by: INTERNAL MEDICINE

## 2023-08-17 NOTE — PROGRESS NOTES
Chief Complaint   Patient presents with    Annual Exam       History of Present Illness      The patient presents for an established patient physical examination and health maintenance visit.    The patient presents for a follow-up related to hyperlipidemia. He is following a low fat diet. He reports that he is exercising. He is taking rosuvastatin. The patient is taking his medication as prescribed. He reports no medication side effects, including muscle cramps, abdominal pain, headaches or weakness. He denies chest pain, shortness of breath, orthopnea, paroxysmal nocturnal dyspnea, dyspnea on exertion, edema, palpitations or syncope.    The patient presents for a follow-up related to hypertension. The patient reports that he has had no headaches or blurred vision. He states that he is taking his medication as prescribed. He is not having medication side effects.    The patient presents for a follow-up related to impaired glucose tolerance. He does not check his blood sugars at home. He denies hypoglycemic symptoms. The patient denies polyuria, polydypsia or polyphagia. He reports no symptoms of neuropathy. The patient is not on medication for his impaired glucose tolerance. The patient does check his feet daily at home.    The patient presents for a follow-up related to GERD. The patient is not on medication for his gastroesophageal reflux. He reports no abdominal pain, belching, diarrhea, dysphagia, early satiety, heartburn, hoarseness, nausea, odynophagia, rectal bleeding, vomiting or weight loss. The GERD has no known aggravating factors.    Medications      Current Outpatient Medications:     aspirin 81 MG tablet, Take 1 tablet by mouth Daily., Disp: , Rfl:     coenzyme Q10 100 MG capsule, Take 1 capsule by mouth Daily., Disp: , Rfl:     fluticasone (FLONASE) 50 MCG/ACT nasal spray, into each nostril Daily As Needed., Disp: , Rfl:     lisinopril (PRINIVIL,ZESTRIL) 40 MG tablet, Take 1 tablet by mouth Daily.,  "Disp: 90 tablet, Rfl: 1    rosuvastatin (CRESTOR) 20 MG tablet, Take 1 tablet by mouth Daily., Disp: 90 tablet, Rfl: 3    tadalafil (CIALIS) 5 MG tablet, TAKE ONE TABLET BY MOUTH DAILY, Disp: 30 tablet, Rfl: 5     Allergies    No Known Allergies    Problem List    Patient Active Problem List   Diagnosis    Diabetes mellitus    Hyperlipidemia    Hypertension    Gastroesophageal reflux disease    Enlarged prostate without lower urinary tract symptoms (luts)    Erectile dysfunction of nonorganic origin    Acute bronchitis    Atopic rhinitis    Liver cyst       Medications, Allergies, Problems List and Past History were reviewed and updated.    Physical Examination    /68   Pulse 56   Temp 98 øF (36.7 øC) (Infrared)   Resp 18   Ht 193.7 cm (76.25\")   Wt 102 kg (225 lb)   BMI 27.21 kg/mý       HEENT: Head- Normocephalic Atraumatic. Facies- Within normal limits. Pinnas- Normal texture and shape bilaterally. Canals- Normal bilaterally. TMs- Normal bilaterally. Nares- Patent bilaterally. Nasal Septum- is normal. There is no tenderness to palpation over the frontal or maxillary sinuses. Lids- Normal bilaterally. Conjunctiva- Clear bilaterally. Sclera- Anicteric bilaterally. Oropharynx- Moist with no lesions. Tonsils- No enlargement, erythema or exudate.    Neck: Thyroid- non enlarged, symmetric and has no nodules. No bruits are detected. ROM- Normal Range of Motion with no rigidity.    Lungs: Auscultation- Clear to auscultation bilaterally. There are no retractions, clubbing or cyanosis. The Expiratory to Inspiratory ratio is equal.    Lymph Nodes: Cervical- no enlarged lymph nodes noted. Clavicular- Deferred. Axillary- Deferred. Inguinal- Deferred.    Cardiovascular: There are no carotid bruits. Heart- Normal Rate with Regular rhythm and no murmurs. There are no gallops. There are no rubs. In the lower extremities there is no edema. The upper extremities do not have edema.    Abdomen: Soft, benign, non-tender " with no masses, hernias, organomegaly or scars.    Male Genitourinary: The penis is uncircumcised with testicles found in the scrotum bilaterally. Testicular Size is normal bilaterally. The penis has no anatomic abnormalities.    Rectal: reveals normal external sphincter tone with no external lesions.    Prostate: The prostate gland is symmetric and smooth with no nodularity.    Dermatologic: The patient has no worrisome or suspicious skin lesions noted.    Impression and Assessment    Normal Physical Examination.    Encounter for Screening for Malignant Neoplasm of the Prostate.    Hyperlipidemia.    Essential Hypertension.    Impaired Glucose Tolerance.    Gastroesophageal Reflux Disease.    Plan    Gastroesophageal Reflux Disease Plan: The patient was instructed to continue the current medications.    Hyperlipidemia Plan: The patient was instructed to exercise daily, eat a low fat diet and continue his medications.    Essential Hypertension Plan: The patient was instructed to continue the current medications.    Impaired Glucose Tolerance Plan: Further plans will be formulated after test results are reviewed.    Counseling was provided regarding: Adequate Aerobic Exercise, Dental Visits, Flossing Teeth, Heart Healthy Diet and Seat Belt Utilization.    The following was ordered for screening and health maintenance: PSA.       Immunizations Ordered and Administered: None.    Diagnoses and all orders for this visit:    1. Physical exam (Primary)    2. Hyperlipidemia, unspecified hyperlipidemia type  -     Comprehensive Metabolic Panel; Future  -     Lipid Panel; Future    3. Essential hypertension  -     CBC & Differential; Future  -     Comprehensive Metabolic Panel; Future  -     TSH; Future  -     POC Urinalysis Dipstick, Automated; Future    4. Impaired glucose tolerance  -     Comprehensive Metabolic Panel; Future  -     Hemoglobin A1c; Future  -     POC Microalbumin; Future    5. Gastroesophageal reflux  disease without esophagitis    6. Prostate cancer screening  -     PSA Screen; Future    BMI is >= 25 and <30. (Overweight) The following options were offered after discussion;: weight loss educational material (shared in after visit summary), exercise counseling/recommendations, and nutrition counseling/recommendations      Return to Office    The patient was instructed to return for follow-up in 6 months. The patient was instructed to return sooner if the condition changes, worsens, or does not resolve.

## 2023-08-25 LAB
TESTOST FREE SERPL-MCNC: 4.6 PG/ML (ref 6.6–18.1)
TESTOST SERPL-MCNC: 370 NG/DL (ref 264–916)

## 2023-08-31 ENCOUNTER — PATIENT MESSAGE (OUTPATIENT)
Dept: INTERNAL MEDICINE | Facility: CLINIC | Age: 63
End: 2023-08-31
Payer: COMMERCIAL

## 2023-08-31 DIAGNOSIS — E29.1 HYPOGONADISM IN MALE: ICD-10-CM

## 2023-08-31 DIAGNOSIS — E29.1 HYPOGONADISM IN MALE: Primary | ICD-10-CM

## 2023-08-31 RX ORDER — TESTOSTERONE 20.25 MG/1.25G
20.25 GEL TOPICAL DAILY
Qty: 38 G | Refills: 2 | Status: SHIPPED | OUTPATIENT
Start: 2023-08-31 | End: 2023-09-01 | Stop reason: SDUPTHER

## 2023-08-31 NOTE — TELEPHONE ENCOUNTER
From: Orlando Meraz  To: Mustapha Yoon  Sent: 8/31/2023 11:48 AM EDT  Subject: Testosterone Perscription     [facility] 11[facility]00 AM today has not received the new prescription for the Testosterone Gel.    Just following up.    Thanks, Td

## 2023-09-01 RX ORDER — TESTOSTERONE 20.25 MG/1.25G
20.25 GEL TOPICAL DAILY
Qty: 38 G | Refills: 0 | Status: SHIPPED | OUTPATIENT
Start: 2023-09-01

## 2023-09-01 NOTE — TELEPHONE ENCOUNTER
Spoke to patient he stated that the pharmacy still does not have any medications. I looked in the chart and Ariana did send it in but it says:     E-Prescribing Status: Transmission to pharmacy failed (8/31/2023  3:59 PM EDT)     Can you please send it in again

## 2023-09-22 RX ORDER — ROSUVASTATIN CALCIUM 20 MG/1
TABLET, COATED ORAL
Qty: 90 TABLET | Refills: 3 | Status: SHIPPED | OUTPATIENT
Start: 2023-09-22

## 2023-09-28 RX ORDER — ROSUVASTATIN CALCIUM 20 MG/1
20 TABLET, COATED ORAL DAILY
Qty: 90 TABLET | Refills: 1 | Status: SHIPPED | OUTPATIENT
Start: 2023-09-28

## 2023-09-28 RX ORDER — LISINOPRIL 40 MG/1
40 TABLET ORAL DAILY
Qty: 90 TABLET | Refills: 1 | Status: SHIPPED | OUTPATIENT
Start: 2023-09-28

## 2023-09-28 NOTE — TELEPHONE ENCOUNTER
"Caller: Orlando Meraz \"Td\"    Relationship: Self    Best call back number: [phone]    Requested Prescriptions:   Requested Prescriptions     Pending Prescriptions Disp Refills    lisinopril (PRINIVIL,ZESTRIL) 40 MG tablet 90 tablet 1     Sig: Take 1 tablet by mouth Daily.    rosuvastatin (CRESTOR) 20 MG tablet 90 tablet 3        Pharmacy where request should be sent: [facility] [id number] [address] & [facility] [phone] [facility] [phone]      Last office visit with prescribing clinician: 8/17/2023   Last telemedicine visit with prescribing clinician: Visit date not found   Next office visit with prescribing clinician: 2/19/2024     Additional details provided by patient: PATIENT IS WANTING TO HAVE THE MEDICATIONS SWITCHED TO HIS LOCAL PHARMACY     Does the patient have less than a 3 day supply:  [] Yes  [x] No    Would you like a call back once the refill request has been completed: [x] Yes [] No    If the office needs to give you a call back, can they leave a voicemail: [x] Yes [] No    [place]   09/28/23 08:23 EDT         "

## 2023-10-04 ENCOUNTER — FLU SHOT (OUTPATIENT)
Dept: INTERNAL MEDICINE | Facility: CLINIC | Age: 63
End: 2023-10-04
Payer: MEDICARE

## 2023-10-04 DIAGNOSIS — Z23 NEED FOR INFLUENZA VACCINATION: Primary | ICD-10-CM

## 2023-10-09 DIAGNOSIS — E29.1 HYPOGONADISM IN MALE: ICD-10-CM

## 2023-10-09 RX ORDER — TESTOSTERONE 20.25 MG/1.25G
GEL TOPICAL
Qty: 37.5 G | Refills: 2 | Status: SHIPPED | OUTPATIENT
Start: 2023-10-09

## 2023-10-09 NOTE — TELEPHONE ENCOUNTER
Last office visit (LOV) for chronic conditions 08/17/23  Next office visit (NOV) 02/19/24  Urine drug screen (UDS)  Controlled substance agreement (CSA)

## 2023-10-11 ENCOUNTER — TELEPHONE (OUTPATIENT)
Dept: INTERNAL MEDICINE | Facility: CLINIC | Age: 63
End: 2023-10-11

## 2023-10-11 DIAGNOSIS — E29.1 HYPOGONADISM IN MALE: Primary | ICD-10-CM

## 2023-10-16 ENCOUNTER — TELEPHONE (OUTPATIENT)
Dept: INTERNAL MEDICINE | Facility: CLINIC | Age: 63
End: 2023-10-16
Payer: MEDICARE

## 2023-10-16 NOTE — TELEPHONE ENCOUNTER
Drug  Testosterone 20.25 MG/1.25GM(1.62%) gel      Outcome  Denied on October 11  Denied. This drug is not covered on the formulary. We are denying your request because we do not show that you have tried at least 2 covered drugs that can treat your condition.

## 2023-10-18 RX ORDER — TESTOSTERONE 16.2 MG/G
1.25 GEL TRANSDERMAL DAILY
Qty: 75 G | Refills: 2 | Status: SHIPPED | OUTPATIENT
Start: 2023-10-18

## 2023-10-18 NOTE — TELEPHONE ENCOUNTER
Note placed on your desk.    I have sent in a new rx for the pump bottle, but this says everything require PA.  Please let patient know.  Mustapha Yoon MD  07:47 EDT  10/18/23

## 2023-10-19 NOTE — TELEPHONE ENCOUNTER
[facility]  States testosterone gel 1.62% does require a PA and that it was denied as patient has not tried the Testosterone Cypionate injections.  States the only thing we can do is file a medical necessity appeal.  States she will fax the appeal form to our office.

## 2023-10-30 NOTE — TELEPHONE ENCOUNTER
Spoke to patient he stated that he does not want to do the injections and would like the appeal filled out.

## 2023-10-30 NOTE — TELEPHONE ENCOUNTER
Please let him know this and see if he wants to start the injections.   Mustapha Yoon MD  06:51 EDT  10/30/23

## 2023-11-02 NOTE — TELEPHONE ENCOUNTER
Appeals form initiated and on your desk for completion and signature. Has to be submitted prior to 11/11/2023

## 2024-01-03 ENCOUNTER — TELEPHONE (OUTPATIENT)
Dept: INTERNAL MEDICINE | Facility: CLINIC | Age: 64
End: 2024-01-03
Payer: MEDICARE

## 2024-01-03 NOTE — TELEPHONE ENCOUNTER
[facility]    Relationship: INSURANCE     Best call back number: [phone]    Who are you requesting to speak with (clinical staff, provider,  specific staff member): DR OSULLIVAN/CLINICAL     What was the call regarding: [facility] NEEDS MORE INFORMATION FOR THE PRIOR AUTHORIZATION TO BE APPROVED.  THEY ARE SENDING A FAX TODAY THAT YOU CAN FILL OUT AND FAX BACK.  THEY STATED IT MUST HAVE THAT THE PATIENT HAS PERSISTENT ANDROGEN DEFICIENCY NOTED AND IF THEY'VE SHANIQUA A LOW LEVEL OF TESTOSTERONE AND PRE TREATMENT.    PLEASE COMPLETE THE FORM THEY ARE FAXING TODAY AND FAX BACK ASAP. IF YOU HAVEN'T RECEIVED THE FAX, PLEASE CALL THE NUMBER ABOVE TO ADVISE.  THANK YOU.

## 2024-01-29 DIAGNOSIS — N52.9 ERECTILE DYSFUNCTION, UNSPECIFIED ERECTILE DYSFUNCTION TYPE: ICD-10-CM

## 2024-01-29 RX ORDER — TADALAFIL 5 MG/1
5 TABLET ORAL DAILY
Qty: 90 TABLET | Refills: 1 | Status: SHIPPED | OUTPATIENT
Start: 2024-01-29

## 2024-03-21 ENCOUNTER — HOSPITAL ENCOUNTER (OUTPATIENT)
Dept: GENERAL RADIOLOGY | Facility: HOSPITAL | Age: 64
Discharge: HOME | End: 2024-03-21
Admitting: INTERNAL MEDICINE
Payer: MEDICARE

## 2024-03-21 ENCOUNTER — OFFICE VISIT (OUTPATIENT)
Dept: INTERNAL MEDICINE | Facility: CLINIC | Age: 64
End: 2024-03-21
Payer: MEDICARE

## 2024-03-21 VITALS
TEMPERATURE: 98 F | RESPIRATION RATE: 18 BRPM | BODY MASS INDEX: 27.57 KG/M2 | HEART RATE: 72 BPM | WEIGHT: 228 LBS | DIASTOLIC BLOOD PRESSURE: 82 MMHG | SYSTOLIC BLOOD PRESSURE: 128 MMHG

## 2024-03-21 DIAGNOSIS — M25.551 RIGHT HIP PAIN: ICD-10-CM

## 2024-03-21 DIAGNOSIS — M54.31 SCIATICA OF RIGHT SIDE: ICD-10-CM

## 2024-03-21 DIAGNOSIS — E29.1 HYPOGONADISM IN MALE: ICD-10-CM

## 2024-03-21 DIAGNOSIS — E78.5 HYPERLIPIDEMIA, UNSPECIFIED HYPERLIPIDEMIA TYPE: ICD-10-CM

## 2024-03-21 DIAGNOSIS — K21.9 GASTROESOPHAGEAL REFLUX DISEASE WITHOUT ESOPHAGITIS: Primary | ICD-10-CM

## 2024-03-21 DIAGNOSIS — R73.02 IMPAIRED GLUCOSE TOLERANCE: ICD-10-CM

## 2024-03-21 DIAGNOSIS — I10 ESSENTIAL HYPERTENSION: ICD-10-CM

## 2024-03-21 LAB
ALBUMIN SERPL-MCNC: 4.6 G/DL (ref 3.5–5.2)
ALBUMIN/GLOB SERPL: 1.8 G/DL
ALP SERPL-CCNC: 67 U/L (ref 39–117)
ALT SERPL W P-5'-P-CCNC: 24 U/L (ref 1–41)
ANION GAP SERPL CALCULATED.3IONS-SCNC: 12.3 MMOL/L (ref 5–15)
AST SERPL-CCNC: 30 U/L (ref 1–40)
BASOPHILS # BLD AUTO: 0.03 10*3/MM3 (ref 0–0.2)
BASOPHILS NFR BLD AUTO: 0.4 % (ref 0–1.5)
BILIRUB BLD-MCNC: NEGATIVE MG/DL
BILIRUB SERPL-MCNC: 1 MG/DL (ref 0–1.2)
BUN SERPL-MCNC: 12 MG/DL (ref 8–23)
BUN/CREAT SERPL: 12.4 (ref 7–25)
CALCIUM SPEC-SCNC: 10.2 MG/DL (ref 8.6–10.5)
CHLORIDE SERPL-SCNC: 103 MMOL/L (ref 98–107)
CHOLEST SERPL-MCNC: 153 MG/DL (ref 0–200)
CLARITY, POC: CLEAR
CO2 SERPL-SCNC: 24.7 MMOL/L (ref 22–29)
COLOR UR: YELLOW
CREAT SERPL-MCNC: 0.97 MG/DL (ref 0.76–1.27)
DEPRECATED RDW RBC AUTO: 43.2 FL (ref 37–54)
EGFRCR SERPLBLD CKD-EPI 2021: 86.6 ML/MIN/1.73
EOSINOPHIL # BLD AUTO: 0.15 10*3/MM3 (ref 0–0.4)
EOSINOPHIL NFR BLD AUTO: 1.9 % (ref 0.3–6.2)
ERYTHROCYTE [DISTWIDTH] IN BLOOD BY AUTOMATED COUNT: 12.7 % (ref 12.3–15.4)
EXPIRATION DATE: NORMAL
GLOBULIN UR ELPH-MCNC: 2.5 GM/DL
GLUCOSE SERPL-MCNC: 126 MG/DL (ref 65–99)
GLUCOSE UR STRIP-MCNC: NEGATIVE MG/DL
HBA1C MFR BLD: 7.7 % (ref 4.8–5.6)
HCT VFR BLD AUTO: 44 % (ref 37.5–51)
HDLC SERPL-MCNC: 47 MG/DL (ref 40–60)
HGB BLD-MCNC: 14.9 G/DL (ref 13–17.7)
IMM GRANULOCYTES # BLD AUTO: 0.02 10*3/MM3 (ref 0–0.05)
IMM GRANULOCYTES NFR BLD AUTO: 0.3 % (ref 0–0.5)
KETONES UR QL: NEGATIVE
LDLC SERPL CALC-MCNC: 82 MG/DL (ref 0–100)
LDLC/HDLC SERPL: 1.68 {RATIO}
LEUKOCYTE EST, POC: NEGATIVE
LYMPHOCYTES # BLD AUTO: 2.34 10*3/MM3 (ref 0.7–3.1)
LYMPHOCYTES NFR BLD AUTO: 29.7 % (ref 19.6–45.3)
Lab: NORMAL
MCH RBC QN AUTO: 31.4 PG (ref 26.6–33)
MCHC RBC AUTO-ENTMCNC: 33.9 G/DL (ref 31.5–35.7)
MCV RBC AUTO: 92.8 FL (ref 79–97)
MONOCYTES # BLD AUTO: 0.71 10*3/MM3 (ref 0.1–0.9)
MONOCYTES NFR BLD AUTO: 9 % (ref 5–12)
NEUTROPHILS NFR BLD AUTO: 4.62 10*3/MM3 (ref 1.7–7)
NEUTROPHILS NFR BLD AUTO: 58.7 % (ref 42.7–76)
NITRITE UR-MCNC: NEGATIVE MG/ML
NRBC BLD AUTO-RTO: 0 /100 WBC (ref 0–0.2)
PH UR: 5 [PH] (ref 5–8)
PLATELET # BLD AUTO: 197 10*3/MM3 (ref 140–450)
PMV BLD AUTO: 11.4 FL (ref 6–12)
POTASSIUM SERPL-SCNC: 4.1 MMOL/L (ref 3.5–5.2)
PROT SERPL-MCNC: 7.1 G/DL (ref 6–8.5)
PROT UR STRIP-MCNC: NEGATIVE MG/DL
RBC # BLD AUTO: 4.74 10*6/MM3 (ref 4.14–5.8)
RBC # UR STRIP: NEGATIVE /UL
SODIUM SERPL-SCNC: 140 MMOL/L (ref 136–145)
SP GR UR: 1.01 (ref 1–1.03)
TRIGL SERPL-MCNC: 136 MG/DL (ref 0–150)
UROBILINOGEN UR QL: NORMAL
VLDLC SERPL-MCNC: 24 MG/DL (ref 5–40)
WBC NRBC COR # BLD AUTO: 7.87 10*3/MM3 (ref 3.4–10.8)

## 2024-03-21 PROCEDURE — 73502 X-RAY EXAM HIP UNI 2-3 VIEWS: CPT

## 2024-03-21 PROCEDURE — 72100 X-RAY EXAM L-S SPINE 2/3 VWS: CPT

## 2024-03-21 PROCEDURE — 80053 COMPREHEN METABOLIC PANEL: CPT | Performed by: INTERNAL MEDICINE

## 2024-03-21 PROCEDURE — 84403 ASSAY OF TOTAL TESTOSTERONE: CPT | Performed by: INTERNAL MEDICINE

## 2024-03-21 PROCEDURE — 83036 HEMOGLOBIN GLYCOSYLATED A1C: CPT | Performed by: INTERNAL MEDICINE

## 2024-03-21 PROCEDURE — 80061 LIPID PANEL: CPT | Performed by: INTERNAL MEDICINE

## 2024-03-21 PROCEDURE — 85025 COMPLETE CBC W/AUTO DIFF WBC: CPT | Performed by: INTERNAL MEDICINE

## 2024-03-21 PROCEDURE — 84402 ASSAY OF FREE TESTOSTERONE: CPT | Performed by: INTERNAL MEDICINE

## 2024-03-21 RX ORDER — PREDNISONE 10 MG/1
TABLET ORAL
Qty: 37 TABLET | Refills: 0 | Status: SHIPPED | OUTPATIENT
Start: 2024-03-21

## 2024-03-21 NOTE — PROGRESS NOTES
Chief Complaint   Patient presents with    Follow-up     6 month follow up chronic medical problems    right hip and leg pain       History of Present Illness      The patient presents for follow-up of hypogonadism. He is using testosterone gel. He has had improvement in his symptoms. He has no medication side effects including skin reactions, edema, priapism, headache or acne. The patient's libido is improved. The patient reports no associated symptoms, including weight gain, breast enlargment or fatigue.    The patient presents for a follow-up related to hyperlipidemia. He is following a low fat diet. He reports that he is exercising. He is taking rosuvastatin. The patient is taking his medication as prescribed. He reports no medication side effects, including muscle cramps, abdominal pain, headaches or weakness. He denies chest pain, shortness of breath, orthopnea, paroxysmal nocturnal dyspnea, dyspnea on exertion, edema, palpitations or syncope.    The patient presents for a follow-up related to GERD. The patient is not on medication for his gastroesophageal reflux. He reports no abdominal pain, belching, diarrhea, dysphagia, early satiety, heartburn, hoarseness, nausea, odynophagia, rectal bleeding, vomiting or weight loss. The GERD has no known aggravating factors.    The patient presents for a follow-up related to hypertension. The patient reports that he has had no headaches or blurred vision. He states that he is taking his medication as prescribed. He is not having medication side effects.    The patient presents for a follow-up related to impaired glucose tolerance. He does not check his blood sugars at home. He denies hypoglycemic symptoms. The patient denies polyuria, polydypsia or polyphagia. He reports no symptoms of neuropathy. The patient is not on medication for his impaired glucose tolerance. The patient does check his feet daily at home.    The patient presents for a follow-up related to low back  pain which began two weeks ago. He states that the pain was first noted to come on insidiously. The pain is not associated with fever, chills, rashes, dysuria, hematuria or a decreased urine stream. The patient denies a past history of malignancy. The pain radiates to the right buttock and to the right thigh. There is no numbness. The patient denies loss of bladder control. The patient denies loss of bowel control. The patient reports no aggravating factors.    The patient presents with pain in the right hip of two weeks duration. There is no history of trauma. The patient has no joint swelling. There is no stiffness. The patient denies a history of overuse or repetitive motion with the affected joints.    The patient denies dry eyes, cough or dry mouth. There are no associated insect bites. There is no family history of rheumatoid arthritis, juvenile rheumatoid arthritis, systemic lupus erythematosis or gout. The patient denies a personal history of malignancy.    The joint pain is aggravated by motion. The pain has no alleviating factors noted.    Medications      Current Outpatient Medications:     aspirin 81 MG tablet, Take 1 tablet by mouth Daily., Disp: , Rfl:     coenzyme Q10 100 MG capsule, Take 1 capsule by mouth Daily., Disp: , Rfl:     fluticasone (FLONASE) 50 MCG/ACT nasal spray, into each nostril Daily As Needed., Disp: , Rfl:     lisinopril (PRINIVIL,ZESTRIL) 40 MG tablet, Take 1 tablet by mouth Daily., Disp: 90 tablet, Rfl: 1    rosuvastatin (CRESTOR) 20 MG tablet, Take 1 tablet by mouth Daily., Disp: 90 tablet, Rfl: 1    tadalafil (CIALIS) 5 MG tablet, TAKE 1 TABLET BY MOUTH DAILY, Disp: 90 tablet, Rfl: 1    Testosterone 20.25 MG/ACT (1.62%) gel, Place 1.25 g on the skin as directed by provider Daily., Disp: 75 g, Rfl: 2    predniSONE (DELTASONE) 10 MG tablet, 2 po bid x 5 days; 1 po bid x 5 days; 1 po daily x 5 days; 1/2 po daily x 4 days, Disp: 37 tablet, Rfl: 0     Allergies    No Known  Allergies    Problem List    Patient Active Problem List   Diagnosis    Diabetes mellitus    Hyperlipidemia    Hypertension    Gastroesophageal reflux disease    Enlarged prostate without lower urinary tract symptoms (luts)    Erectile dysfunction of nonorganic origin    Acute bronchitis    Atopic rhinitis    Liver cyst       Medications, Allergies, Problems List and Past History were reviewed and updated.    Physical Examination    /82   Pulse 72   Temp 98 °F (36.7 °C) (Infrared)   Resp 18   Wt 103 kg (228 lb)   BMI 27.57 kg/m²       HEENT: Head- Normocephalic Atraumatic. Facies- Within normal limits. Pinnas- Normal texture and shape bilaterally. Canals- Normal bilaterally. TMs- Normal bilaterally. Nares- Patent bilaterally. Nasal Septum- is normal. There is no tenderness to palpation over the frontal or maxillary sinuses. Lids- Normal bilaterally. Conjunctiva- Clear bilaterally. Sclera- Anicteric bilaterally. Oropharynx- Moist with no lesions. Tonsils- No enlargement, erythema or exudate.    Neck: Thyroid- non enlarged, symmetric and has no nodules. No bruits are detected. ROM- Normal Range of Motion with no rigidity.    Lungs: Auscultation- Clear to auscultation bilaterally. There are no retractions, clubbing or cyanosis. The Expiratory to Inspiratory ratio is equal.    Cardiovascular: There are no carotid bruits. Heart- Normal Rate with Regular rhythm and no murmurs. There are no gallops. There are no rubs. In the lower extremities there is no edema. The upper extremities do not have edema.    Abdomen: Soft, benign, non-tender with no masses, hernias, organomegaly or scars.    Back: The patient has a normal spinal curvature with no evidence of scoliosis. There are no skin lesions noted on the back. Palpation reveals no tenderness and normal muscle tone. The straight leg raising test is negative. The back has normal flexion, extension, rotation, and lateral bending.    Lower Extremity Neuro Exam:  Muscle Strength is 5/5 in all major lower extremity muscle groups. Deep Tendon Reflexes are 2+ and equal in the patellar and Achilles distributions bilaterally. Sensation is normal in all distributions.    Hips: The right hip has normal peter landmarks and no noted atrophy. Right hip flexion is normal. Right hip extension is normal. Right Hip abduction is normal. There is no pain with forced abduction. Right hip adduction is normal. Right hip internal rotation is normal. Right hip external rotation is normal. There is no pain with straight leg raising on the right.    Impression and Assessment    Hyperlipidemia.    Gastroesophageal Reflux Disease.    Essential Hypertension.    Impaired Glucose Tolerance.    Low Back Pain.    Hypogonadism.    Right Hip Pain.    Plan    Gastroesophageal Reflux Disease Plan: The patient was instructed to continue the current medications.    Hyperlipidemia Plan: The patient was instructed to exercise daily, eat a low fat diet and continue his medications.    Essential Hypertension Plan: The patient was instructed to continue the current medications.    Impaired Glucose Tolerance Plan: Further plans will be formulated after test results are reviewed.    Hypogonadism Plan: The patient was instructed to continue the current medications. Further plans will be made after testing.    Low Back Pain Plan: He was referred to physical therapy. Medication will be added as noted. Further plans will be made after testing.    Right Hip Pain Plan: He was referred to physical therapy. Medication will be added as noted. Further plans will be made after testing.    Diagnoses and all orders for this visit:    1. Gastroesophageal reflux disease without esophagitis (Primary)    2. Hyperlipidemia, unspecified hyperlipidemia type  -     Lipid Panel; Future  -     Lipid Panel    3. Hypogonadism in male  -     Comprehensive Metabolic Panel; Future  -     Testosterone, Free, Total; Future  -     Comprehensive  Metabolic Panel  -     Testosterone, Free, Total    4. Essential hypertension  -     CBC & Differential; Future  -     Comprehensive Metabolic Panel; Future  -     POC Urinalysis Dipstick, Automated; Future  -     CBC & Differential  -     Comprehensive Metabolic Panel  -     POC Urinalysis Dipstick, Automated    5. Impaired glucose tolerance  -     Comprehensive Metabolic Panel; Future  -     Hemoglobin A1c; Future  -     Comprehensive Metabolic Panel  -     Hemoglobin A1c    6. Sciatica of right side  -     XR Spine Lumbar 2 or 3 View; Future  -     predniSONE (DELTASONE) 10 MG tablet; 2 po bid x 5 days; 1 po bid x 5 days; 1 po daily x 5 days; 1/2 po daily x 4 days  Dispense: 37 tablet; Refill: 0  -     Ambulatory Referral to Physical Therapy Evaluate and treat    7. Right hip pain  -     XR Hip With or Without Pelvis 2 - 3 View Right; Future  -     Ambulatory Referral to Physical Therapy Evaluate and treat        Return to Office    The patient was instructed to return for follow-up in 1 month. The patient was instructed to return sooner if the condition changes, worsens, or does not resolve.

## 2024-03-25 ENCOUNTER — TELEPHONE (OUTPATIENT)
Dept: INTERNAL MEDICINE | Facility: CLINIC | Age: 64
End: 2024-03-25
Payer: MEDICARE

## 2024-03-25 LAB
TESTOST FREE SERPL-MCNC: 5.1 PG/ML (ref 6.6–18.1)
TESTOST SERPL-MCNC: 326 NG/DL (ref 264–916)

## 2024-03-25 NOTE — TELEPHONE ENCOUNTER
----- Message from Mustapha Yoon MD sent at 3/25/2024  7:13 AM EDT -----  Please call the patient regarding his abnormal result.  His labs are normal except his blood sugar is elevated.  I would recommend that he start metformin 500 mg po BID.  If he is agreeable, please start 500 mg po BID.  Please call in 6 month supply (Either 1 month with RF 5 or 3 months with RF 1).

## 2024-03-25 NOTE — TELEPHONE ENCOUNTER
Patient has been given providers message and recommendations. Patient verb good understanding. Medication has been sent to pharmacy.

## 2024-04-08 RX ORDER — LISINOPRIL 40 MG/1
40 TABLET ORAL DAILY
Qty: 90 TABLET | Refills: 1 | Status: SHIPPED | OUTPATIENT
Start: 2024-04-08

## 2024-04-08 RX ORDER — ROSUVASTATIN CALCIUM 20 MG/1
20 TABLET, COATED ORAL DAILY
Qty: 90 TABLET | Refills: 1 | Status: SHIPPED | OUTPATIENT
Start: 2024-04-08

## 2024-05-08 ENCOUNTER — OFFICE VISIT (OUTPATIENT)
Dept: INTERNAL MEDICINE | Facility: CLINIC | Age: 64
End: 2024-05-08
Payer: MEDICARE

## 2024-05-08 VITALS
HEART RATE: 72 BPM | DIASTOLIC BLOOD PRESSURE: 94 MMHG | SYSTOLIC BLOOD PRESSURE: 132 MMHG | HEIGHT: 76 IN | WEIGHT: 223 LBS | BODY MASS INDEX: 27.16 KG/M2 | RESPIRATION RATE: 16 BRPM | TEMPERATURE: 98.2 F

## 2024-05-08 DIAGNOSIS — E29.1 HYPOGONADISM IN MALE: ICD-10-CM

## 2024-05-08 DIAGNOSIS — I10 ESSENTIAL HYPERTENSION: ICD-10-CM

## 2024-05-08 DIAGNOSIS — Z00.00 WELCOME TO MEDICARE PREVENTIVE VISIT: Primary | ICD-10-CM

## 2024-05-08 DIAGNOSIS — Z23 IMMUNIZATION DUE: ICD-10-CM

## 2024-05-08 DIAGNOSIS — Z79.899 HIGH RISK MEDICATION USE: ICD-10-CM

## 2024-05-08 DIAGNOSIS — E78.5 HYPERLIPIDEMIA, UNSPECIFIED HYPERLIPIDEMIA TYPE: ICD-10-CM

## 2024-05-08 DIAGNOSIS — R73.02 IMPAIRED GLUCOSE TOLERANCE: ICD-10-CM

## 2024-05-08 DIAGNOSIS — K21.9 GASTROESOPHAGEAL REFLUX DISEASE WITHOUT ESOPHAGITIS: ICD-10-CM

## 2024-05-08 LAB
ALBUMIN SERPL-MCNC: 4.7 G/DL (ref 3.5–5.2)
ALBUMIN/GLOB SERPL: 1.9 G/DL
ALP SERPL-CCNC: 75 U/L (ref 39–117)
ALT SERPL W P-5'-P-CCNC: 20 U/L (ref 1–41)
ANION GAP SERPL CALCULATED.3IONS-SCNC: 10.8 MMOL/L (ref 5–15)
AST SERPL-CCNC: 23 U/L (ref 1–40)
BASOPHILS # BLD AUTO: 0.05 10*3/MM3 (ref 0–0.2)
BASOPHILS NFR BLD AUTO: 0.6 % (ref 0–1.5)
BILIRUB BLD-MCNC: NEGATIVE MG/DL
BILIRUB SERPL-MCNC: 0.7 MG/DL (ref 0–1.2)
BUN SERPL-MCNC: 10 MG/DL (ref 8–23)
BUN/CREAT SERPL: 10.8 (ref 7–25)
CALCIUM SPEC-SCNC: 9.9 MG/DL (ref 8.6–10.5)
CHLORIDE SERPL-SCNC: 99 MMOL/L (ref 98–107)
CHOLEST SERPL-MCNC: 149 MG/DL (ref 0–200)
CLARITY, POC: CLEAR
CO2 SERPL-SCNC: 27.2 MMOL/L (ref 22–29)
COLOR UR: YELLOW
CREAT SERPL-MCNC: 0.93 MG/DL (ref 0.76–1.27)
DEPRECATED RDW RBC AUTO: 43.3 FL (ref 37–54)
EGFRCR SERPLBLD CKD-EPI 2021: 91.1 ML/MIN/1.73
EOSINOPHIL # BLD AUTO: 0.16 10*3/MM3 (ref 0–0.4)
EOSINOPHIL NFR BLD AUTO: 1.9 % (ref 0.3–6.2)
ERYTHROCYTE [DISTWIDTH] IN BLOOD BY AUTOMATED COUNT: 12.7 % (ref 12.3–15.4)
EXPIRATION DATE: ABNORMAL
GLOBULIN UR ELPH-MCNC: 2.5 GM/DL
GLUCOSE SERPL-MCNC: 174 MG/DL (ref 65–99)
GLUCOSE UR STRIP-MCNC: ABNORMAL MG/DL
HBA1C MFR BLD: 9.3 % (ref 4.8–5.6)
HCT VFR BLD AUTO: 43.8 % (ref 37.5–51)
HDLC SERPL-MCNC: 42 MG/DL (ref 40–60)
HGB BLD-MCNC: 14.9 G/DL (ref 13–17.7)
IMM GRANULOCYTES # BLD AUTO: 0.05 10*3/MM3 (ref 0–0.05)
IMM GRANULOCYTES NFR BLD AUTO: 0.6 % (ref 0–0.5)
KETONES UR QL: NEGATIVE
LDLC SERPL CALC-MCNC: 79 MG/DL (ref 0–100)
LDLC/HDLC SERPL: 1.78 {RATIO}
LEUKOCYTE EST, POC: NEGATIVE
LYMPHOCYTES # BLD AUTO: 2.19 10*3/MM3 (ref 0.7–3.1)
LYMPHOCYTES NFR BLD AUTO: 25.7 % (ref 19.6–45.3)
Lab: ABNORMAL
MCH RBC QN AUTO: 31.8 PG (ref 26.6–33)
MCHC RBC AUTO-ENTMCNC: 34 G/DL (ref 31.5–35.7)
MCV RBC AUTO: 93.6 FL (ref 79–97)
MONOCYTES # BLD AUTO: 0.93 10*3/MM3 (ref 0.1–0.9)
MONOCYTES NFR BLD AUTO: 10.9 % (ref 5–12)
NEUTROPHILS NFR BLD AUTO: 5.13 10*3/MM3 (ref 1.7–7)
NEUTROPHILS NFR BLD AUTO: 60.3 % (ref 42.7–76)
NITRITE UR-MCNC: NEGATIVE MG/ML
NRBC BLD AUTO-RTO: 0 /100 WBC (ref 0–0.2)
PH UR: 5 [PH] (ref 5–8)
PLATELET # BLD AUTO: 226 10*3/MM3 (ref 140–450)
PMV BLD AUTO: 10.8 FL (ref 6–12)
POTASSIUM SERPL-SCNC: 4.1 MMOL/L (ref 3.5–5.2)
PROT SERPL-MCNC: 7.2 G/DL (ref 6–8.5)
PROT UR STRIP-MCNC: NEGATIVE MG/DL
RBC # BLD AUTO: 4.68 10*6/MM3 (ref 4.14–5.8)
RBC # UR STRIP: NEGATIVE /UL
SODIUM SERPL-SCNC: 137 MMOL/L (ref 136–145)
SP GR UR: 1.01 (ref 1–1.03)
TRIGL SERPL-MCNC: 161 MG/DL (ref 0–150)
TSH SERPL DL<=0.05 MIU/L-ACNC: 1.44 UIU/ML (ref 0.27–4.2)
UROBILINOGEN UR QL: NORMAL
VLDLC SERPL-MCNC: 28 MG/DL (ref 5–40)
WBC NRBC COR # BLD AUTO: 8.51 10*3/MM3 (ref 3.4–10.8)

## 2024-05-08 PROCEDURE — 85025 COMPLETE CBC W/AUTO DIFF WBC: CPT | Performed by: INTERNAL MEDICINE

## 2024-05-08 PROCEDURE — 80061 LIPID PANEL: CPT | Performed by: INTERNAL MEDICINE

## 2024-05-08 PROCEDURE — 80053 COMPREHEN METABOLIC PANEL: CPT | Performed by: INTERNAL MEDICINE

## 2024-05-08 PROCEDURE — 83036 HEMOGLOBIN GLYCOSYLATED A1C: CPT | Performed by: INTERNAL MEDICINE

## 2024-05-08 PROCEDURE — 3051F HG A1C>EQUAL 7.0%<8.0%: CPT | Performed by: INTERNAL MEDICINE

## 2024-05-08 PROCEDURE — 3075F SYST BP GE 130 - 139MM HG: CPT | Performed by: INTERNAL MEDICINE

## 2024-05-08 PROCEDURE — 81003 URINALYSIS AUTO W/O SCOPE: CPT | Performed by: INTERNAL MEDICINE

## 2024-05-08 PROCEDURE — 84402 ASSAY OF FREE TESTOSTERONE: CPT | Performed by: INTERNAL MEDICINE

## 2024-05-08 PROCEDURE — 99214 OFFICE O/P EST MOD 30 MIN: CPT | Performed by: INTERNAL MEDICINE

## 2024-05-08 PROCEDURE — 84403 ASSAY OF TOTAL TESTOSTERONE: CPT | Performed by: INTERNAL MEDICINE

## 2024-05-08 PROCEDURE — G0403 EKG FOR INITIAL PREVENT EXAM: HCPCS | Performed by: INTERNAL MEDICINE

## 2024-05-08 PROCEDURE — 3080F DIAST BP >= 90 MM HG: CPT | Performed by: INTERNAL MEDICINE

## 2024-05-08 PROCEDURE — 1160F RVW MEDS BY RX/DR IN RCRD: CPT | Performed by: INTERNAL MEDICINE

## 2024-05-08 PROCEDURE — 36415 COLL VENOUS BLD VENIPUNCTURE: CPT | Performed by: INTERNAL MEDICINE

## 2024-05-08 PROCEDURE — 90677 PCV20 VACCINE IM: CPT | Performed by: INTERNAL MEDICINE

## 2024-05-08 PROCEDURE — G0402 INITIAL PREVENTIVE EXAM: HCPCS | Performed by: INTERNAL MEDICINE

## 2024-05-08 PROCEDURE — 1170F FXNL STATUS ASSESSED: CPT | Performed by: INTERNAL MEDICINE

## 2024-05-08 PROCEDURE — 1125F AMNT PAIN NOTED PAIN PRSNT: CPT | Performed by: INTERNAL MEDICINE

## 2024-05-08 PROCEDURE — G0009 ADMIN PNEUMOCOCCAL VACCINE: HCPCS | Performed by: INTERNAL MEDICINE

## 2024-05-08 PROCEDURE — 84443 ASSAY THYROID STIM HORMONE: CPT | Performed by: INTERNAL MEDICINE

## 2024-05-12 LAB
1OH-MIDAZOLAM UR QL SCN: NOT DETECTED NG/MG CREAT
6MAM UR QL SCN: NEGATIVE NG/ML
7AMINOCLONAZEPAM/CREAT UR: NOT DETECTED NG/MG CREAT
A-OH ALPRAZ/CREAT UR: NOT DETECTED NG/MG CREAT
A-OH-TRIAZOLAM/CREAT UR CFM: NOT DETECTED NG/MG CREAT
ACP UR QL CFM: NOT DETECTED
ALPRAZ/CREAT UR CFM: NOT DETECTED NG/MG CREAT
AMPHETAMINES UR QL SCN: NEGATIVE NG/ML
APAP UR QL SCN: NEGATIVE UG/ML
BARBITURATES UR QL SCN: NEGATIVE NG/ML
BENZODIAZ SCN METH UR: NEGATIVE
BUPRENORPHINE UR QL SCN: NEGATIVE
BUPRENORPHINE/CREAT UR: NOT DETECTED NG/MG CREAT
CANNABINOIDS UR QL SCN: NEGATIVE NG/ML
CARISOPRODOL UR QL: NEGATIVE NG/ML
CLONAZEPAM/CREAT UR CFM: NOT DETECTED NG/MG CREAT
COCAINE+BZE UR QL SCN: NEGATIVE NG/ML
CREAT UR-MCNC: 71 MG/DL
D-METHORPHAN UR-MCNC: NOT DETECTED NG/ML
D-METHORPHAN+LEVORPHANOL UR QL: NOT DETECTED
DESALKYLFLURAZ/CREAT UR: NOT DETECTED NG/MG CREAT
DIAZEPAM/CREAT UR: NOT DETECTED NG/MG CREAT
ETHANOL UR QL SCN: NEGATIVE G/DL
ETHANOL UR QL SCN: NEGATIVE NG/ML
FENTANYL CTO UR SCN-MCNC: NEGATIVE NG/ML
FENTANYL/CREAT UR: NOT DETECTED NG/MG CREAT
FLUNITRAZEPAM UR QL SCN: NOT DETECTED NG/MG CREAT
GABAPENTIN UR-MCNC: NEGATIVE UG/ML
HYPNOTICS UR QL SCN: NEGATIVE
KETAMINE UR QL: NOT DETECTED
LORAZEPAM/CREAT UR: NOT DETECTED NG/MG CREAT
MEPERIDINE UR QL SCN: NEGATIVE NG/ML
METHADONE UR QL SCN: NEGATIVE NG/ML
METHADONE+METAB UR QL SCN: NEGATIVE NG/ML
MIDAZOLAM/CREAT UR CFM: NOT DETECTED NG/MG CREAT
MISCELLANEOUS, UR: NEGATIVE
NORBUPRENORPHINE/CREAT UR: NOT DETECTED NG/MG CREAT
NORDIAZEPAM/CREAT UR: NOT DETECTED NG/MG CREAT
NORFENTANYL/CREAT UR: NOT DETECTED NG/MG CREAT
NORFLUNITRAZEPAM UR-MCNC: NOT DETECTED NG/MG CREAT
NORKETAMINE UR-MCNC: NOT DETECTED UG/ML
OPIATES UR SCN-MCNC: NEGATIVE NG/ML
OTHER HALLUCINOGENS UR: NEGATIVE
OXAZEPAM/CREAT UR: NOT DETECTED NG/MG CREAT
OXYCODONE CTO UR SCN-MCNC: NEGATIVE NG/ML
PCP UR QL SCN: NEGATIVE NG/ML
PRESCRIBED MEDICATIONS: NORMAL
PROPOXYPH UR QL SCN: NEGATIVE NG/ML
TAPENTADOL CTO UR SCN-MCNC: NEGATIVE NG/ML
TEMAZEPAM/CREAT UR: NOT DETECTED NG/MG CREAT
TRAMADOL UR QL SCN: NEGATIVE NG/ML
ZALEPLON UR-MCNC: NOT DETECTED NG/ML
ZOLPIDEM PHENYL-4-CARB UR QL SCN: NOT DETECTED
ZOLPIDEM UR QL SCN: NOT DETECTED
ZOPICLONE-N-OXIDE UR-MCNC: NOT DETECTED NG/ML

## 2024-05-14 LAB
TESTOST FREE SERPL-MCNC: 6.9 PG/ML (ref 6.6–18.1)
TESTOST SERPL-MCNC: 420 NG/DL (ref 264–916)

## 2024-05-23 NOTE — TELEPHONE ENCOUNTER
Last office visit (LOV) for chronic conditions 05/08/24  Next office visit (NOV) 09/17/24  Urine drug screen (UDS) 05/08/24  Controlled substance agreement (CSA) 05/08/24

## 2024-05-24 RX ORDER — TESTOSTERONE 20.25 MG/1.25G
GEL TOPICAL
Qty: 37.5 G | Refills: 2 | Status: SHIPPED | OUTPATIENT
Start: 2024-05-24

## 2024-06-26 DIAGNOSIS — N52.9 ERECTILE DYSFUNCTION, UNSPECIFIED ERECTILE DYSFUNCTION TYPE: ICD-10-CM

## 2024-06-26 RX ORDER — TADALAFIL 5 MG/1
5 TABLET ORAL DAILY
Qty: 90 TABLET | Refills: 1 | Status: SHIPPED | OUTPATIENT
Start: 2024-06-26

## 2024-07-22 ENCOUNTER — HOSPITAL ENCOUNTER (EMERGENCY)
Facility: HOSPITAL | Age: 64
Discharge: HOME | End: 2024-07-22
Attending: EMERGENCY MEDICINE | Admitting: EMERGENCY MEDICINE
Payer: MEDICARE

## 2024-07-22 ENCOUNTER — APPOINTMENT (OUTPATIENT)
Dept: GENERAL RADIOLOGY | Facility: HOSPITAL | Age: 64
End: 2024-07-22
Payer: MEDICARE

## 2024-07-22 VITALS
HEIGHT: 76 IN | BODY MASS INDEX: 26.79 KG/M2 | SYSTOLIC BLOOD PRESSURE: 143 MMHG | TEMPERATURE: 98.6 F | RESPIRATION RATE: 16 BRPM | HEART RATE: 65 BPM | OXYGEN SATURATION: 98 % | DIASTOLIC BLOOD PRESSURE: 98 MMHG | WEIGHT: 220 LBS

## 2024-07-22 DIAGNOSIS — Z86.79 HISTORY OF HYPERTENSION: ICD-10-CM

## 2024-07-22 DIAGNOSIS — I47.10 SUPRAVENTRICULAR TACHYCARDIA: Primary | ICD-10-CM

## 2024-07-22 DIAGNOSIS — R79.89 ELEVATED TROPONIN: ICD-10-CM

## 2024-07-22 LAB
ALBUMIN SERPL-MCNC: 4.5 G/DL (ref 3.5–5.2)
ALBUMIN/GLOB SERPL: 2 G/DL
ALP SERPL-CCNC: 81 U/L (ref 39–117)
ALT SERPL W P-5'-P-CCNC: 22 U/L (ref 1–41)
ANION GAP SERPL CALCULATED.3IONS-SCNC: 13 MMOL/L (ref 5–15)
AST SERPL-CCNC: 30 U/L (ref 1–40)
BASOPHILS # BLD AUTO: 0.05 10*3/MM3 (ref 0–0.2)
BASOPHILS NFR BLD AUTO: 0.8 % (ref 0–1.5)
BILIRUB SERPL-MCNC: 0.5 MG/DL (ref 0–1.2)
BUN SERPL-MCNC: 16 MG/DL (ref 8–23)
BUN/CREAT SERPL: 15.1 (ref 7–25)
CALCIUM SPEC-SCNC: 9.5 MG/DL (ref 8.6–10.5)
CHLORIDE SERPL-SCNC: 104 MMOL/L (ref 98–107)
CO2 SERPL-SCNC: 24 MMOL/L (ref 22–29)
CREAT SERPL-MCNC: 1.06 MG/DL (ref 0.76–1.27)
DEPRECATED RDW RBC AUTO: 43.4 FL (ref 37–54)
EGFRCR SERPLBLD CKD-EPI 2021: 77.9 ML/MIN/1.73
EOSINOPHIL # BLD AUTO: 0.2 10*3/MM3 (ref 0–0.4)
EOSINOPHIL NFR BLD AUTO: 3.1 % (ref 0.3–6.2)
ERYTHROCYTE [DISTWIDTH] IN BLOOD BY AUTOMATED COUNT: 12.6 % (ref 12.3–15.4)
GEN 5 2HR TROPONIN T REFLEX: 57 NG/L
GLOBULIN UR ELPH-MCNC: 2.3 GM/DL
GLUCOSE SERPL-MCNC: 164 MG/DL (ref 65–99)
HCT VFR BLD AUTO: 44.4 % (ref 37.5–51)
HGB BLD-MCNC: 14.9 G/DL (ref 13–17.7)
HOLD SPECIMEN: NORMAL
IMM GRANULOCYTES # BLD AUTO: 0.03 10*3/MM3 (ref 0–0.05)
IMM GRANULOCYTES NFR BLD AUTO: 0.5 % (ref 0–0.5)
LIPASE SERPL-CCNC: 31 U/L (ref 13–60)
LYMPHOCYTES # BLD AUTO: 2.25 10*3/MM3 (ref 0.7–3.1)
LYMPHOCYTES NFR BLD AUTO: 35 % (ref 19.6–45.3)
MCH RBC QN AUTO: 31.3 PG (ref 26.6–33)
MCHC RBC AUTO-ENTMCNC: 33.6 G/DL (ref 31.5–35.7)
MCV RBC AUTO: 93.3 FL (ref 79–97)
MONOCYTES # BLD AUTO: 0.67 10*3/MM3 (ref 0.1–0.9)
MONOCYTES NFR BLD AUTO: 10.4 % (ref 5–12)
NEUTROPHILS NFR BLD AUTO: 3.22 10*3/MM3 (ref 1.7–7)
NEUTROPHILS NFR BLD AUTO: 50.2 % (ref 42.7–76)
NRBC BLD AUTO-RTO: 0 /100 WBC (ref 0–0.2)
NT-PROBNP SERPL-MCNC: 148.2 PG/ML (ref 0–900)
PLATELET # BLD AUTO: 177 10*3/MM3 (ref 140–450)
PMV BLD AUTO: 11.4 FL (ref 6–12)
POTASSIUM SERPL-SCNC: 3.8 MMOL/L (ref 3.5–5.2)
PROT SERPL-MCNC: 6.8 G/DL (ref 6–8.5)
QT INTERVAL: 382 MS
QTC INTERVAL: 448 MS
RBC # BLD AUTO: 4.76 10*6/MM3 (ref 4.14–5.8)
SODIUM SERPL-SCNC: 141 MMOL/L (ref 136–145)
T4 FREE SERPL-MCNC: 1.43 NG/DL (ref 0.92–1.68)
TROPONIN T DELTA: 33 NG/L
TROPONIN T SERPL HS-MCNC: 24 NG/L
TSH SERPL DL<=0.05 MIU/L-ACNC: 1.8 UIU/ML (ref 0.27–4.2)
WBC NRBC COR # BLD AUTO: 6.42 10*3/MM3 (ref 3.4–10.8)
WHOLE BLOOD HOLD COAG: NORMAL
WHOLE BLOOD HOLD SPECIMEN: NORMAL

## 2024-07-22 PROCEDURE — 80053 COMPREHEN METABOLIC PANEL: CPT | Performed by: EMERGENCY MEDICINE

## 2024-07-22 PROCEDURE — 85025 COMPLETE CBC W/AUTO DIFF WBC: CPT | Performed by: EMERGENCY MEDICINE

## 2024-07-22 PROCEDURE — 71045 X-RAY EXAM CHEST 1 VIEW: CPT

## 2024-07-22 PROCEDURE — 36415 COLL VENOUS BLD VENIPUNCTURE: CPT

## 2024-07-22 PROCEDURE — 99284 EMERGENCY DEPT VISIT MOD MDM: CPT

## 2024-07-22 PROCEDURE — 84484 ASSAY OF TROPONIN QUANT: CPT | Performed by: EMERGENCY MEDICINE

## 2024-07-22 PROCEDURE — 83690 ASSAY OF LIPASE: CPT | Performed by: EMERGENCY MEDICINE

## 2024-07-22 PROCEDURE — 93005 ELECTROCARDIOGRAM TRACING: CPT | Performed by: EMERGENCY MEDICINE

## 2024-07-22 PROCEDURE — 84443 ASSAY THYROID STIM HORMONE: CPT | Performed by: EMERGENCY MEDICINE

## 2024-07-22 PROCEDURE — 84439 ASSAY OF FREE THYROXINE: CPT | Performed by: EMERGENCY MEDICINE

## 2024-07-22 PROCEDURE — 83880 ASSAY OF NATRIURETIC PEPTIDE: CPT | Performed by: EMERGENCY MEDICINE

## 2024-07-22 RX ORDER — SODIUM CHLORIDE 0.9 % (FLUSH) 0.9 %
10 SYRINGE (ML) INJECTION AS NEEDED
Status: DISCONTINUED | OUTPATIENT
Start: 2024-07-22 | End: 2024-07-22 | Stop reason: HOSPADM

## 2024-07-22 RX ORDER — ASPIRIN 81 MG/1
324 TABLET, CHEWABLE ORAL ONCE
Status: DISCONTINUED | OUTPATIENT
Start: 2024-07-22 | End: 2024-07-22 | Stop reason: HOSPADM

## 2024-07-22 NOTE — ED PROVIDER NOTES
[facility]    EMERGENCY DEPARTMENT ENCOUNTER      Pt Name: Orlando Meraz  MRN: [id number]  YOB: 1960  Date of evaluation: 7/22/2024  Provider: José Miguel Saunders MD    CHIEF COMPLAINT       Chief Complaint   Patient presents with    Chest Pain         HISTORY OF PRESENT ILLNESS   Orlando Meraz is a 63 y.o. male who presents to the emergency department with complaint of sudden onset chest tightness, racing heartbeat, and tingling in both of his elbows that began just prior to arrival.  Patient states that this occurred or began just after he finished a walk.  As you know per report from EMS, patient heart rate was between 215 and 225 on their arrival.  Patient spontaneously converted to sinus rhythm without any intervention.  Blood pressure and remainder of vital signs were stable during transport.  He has a history of hypertension and hyperlipidemia but no prior cardiac history.      Nursing notes were reviewed.    REVIEW OF SYSTEMS     ROS:  A chief complaint appropriate review of systems was completed and is negative except as noted in the HPI.      PAST MEDICAL HISTORY     Past Medical History:   Diagnosis Date    GERD (gastroesophageal reflux disease)     Hyperlipidemia     Hypertension     Neuroma     Accoustic Neuroma         SURGICAL HISTORY       Past Surgical History:   Procedure Laterality Date    VASECTOMY           CURRENT MEDICATIONS       Current Facility-Administered Medications:     aspirin chewable tablet 324 mg, 324 mg, Oral, Once, Zain Jurado MD    sodium chloride 0.9 % flush 10 mL, 10 mL, Intravenous, PRN, Zain Jurado MD    Current Outpatient Medications:     aspirin 81 MG tablet, Take 1 tablet by mouth Daily., Disp: , Rfl:     coenzyme Q10 100 MG capsule, Take 1 capsule by mouth Daily., Disp: , Rfl:     fluticasone (FLONASE) 50 MCG/ACT nasal spray, into each nostril Daily As Needed., Disp: , Rfl:     lisinopril (PRINIVIL,ZESTRIL) 40 MG tablet, TAKE 1 TABLET BY  MOUTH DAILY, Disp: 90 tablet, Rfl: 1    metFORMIN (GLUCOPHAGE) 1000 MG tablet, Take 1 tablet by mouth 2 (Two) Times a Day With Meals., Disp: 180 tablet, Rfl: 1    rosuvastatin (CRESTOR) 20 MG tablet, TAKE 1 TABLET BY MOUTH DAILY, Disp: 90 tablet, Rfl: 1    tadalafil (CIALIS) 5 MG tablet, TAKE 1 TABLET BY MOUTH DAILY, Disp: 90 tablet, Rfl: 1    Testosterone 20.25 MG/1.25GM (1.62%) gel, APPLY 1 PACKET TO THE SKIN DAILY, Disp: 37.5 g, Rfl: 2    Testosterone 20.25 MG/ACT (1.62%) gel, Place 1.25 g on the skin as directed by provider Daily., Disp: 75 g, Rfl: 2    ALLERGIES     Patient has no known allergies.    FAMILY HISTORY       Family History   Problem Relation Age of Onset    Diabetes Father     Heart disease Father     Heart disease Mother           SOCIAL HISTORY       Social History     Socioeconomic History    Marital status:    Tobacco Use    Smoking status: Never     Passive exposure: Never    Smokeless tobacco: Never   Vaping Use    Vaping status: Never Used   Substance and Sexual Activity    Alcohol use: No    Drug use: No    Sexual activity: Defer         PHYSICAL EXAM    (up to 7 for level 4, 8 or more for level 5)     Vitals:    07/22/24 1539 07/22/24 1609 07/22/24 1610 07/22/24 1611   BP:       BP Location:       Patient Position:       Pulse: 73 76 77 65   Resp:       Temp:       TempSrc:       SpO2: 97% 97% 98% 98%   Weight:       Height:           General: Awake, alert, no acute distress.  HEENT: Conjunctivae normal.  Neck: Trachea midline.  Cardiac: Heart regular rate, rhythm, no murmurs, rubs, or gallops  Lungs: Lungs are clear to auscultation, there is no wheezing, rhonchi, or rales. There is no use of accessory muscles.  Chest wall: There is no tenderness to palpation over the chest wall or over ribs  Abdomen: Abdomen is soft, nontender, nondistended. There are no firm or pulsatile masses, no rebound rigidity or guarding.   Musculoskeletal: No deformity.  Neuro: Alert and oriented x  4.  Dermatology: Skin is warm and dry  Psych: Mentation is grossly normal, cognition is grossly normal. Affect is appropriate.        DIAGNOSTIC RESULTS     EKG: All EKGs are interpreted by the Emergency Department Physician who either signs or Co-signs this chart in the absence of a cardiologist.    ECG 12 Lead ED Triage Standing Order; Chest Pain   Preliminary Result   Test Reason : ED Triage Standing Order~   Blood Pressure :   */*   mmHG   Vent. Rate :  64 BPM     Atrial Rate :  64 BPM      P-R Int : 154 ms          QRS Dur :  94 ms       QT Int : 404 ms       P-R-T Axes :  27   1  61 degrees      QTc Int : 416 ms      Normal sinus rhythm   Normal ECG   When compared with ECG of 22-JUL-2024 13:53, (Unconfirmed)   premature ventricular complexes are no longer present      Referred By: ED MD           Confirmed By:       ECG 12 Lead ED Triage Standing Order; Chest Pain   Preliminary Result   Test Reason : ED Triage Standing Order~   Blood Pressure :   */*   mmHG   Vent. Rate :  83 BPM     Atrial Rate :  83 BPM      P-R Int : 162 ms          QRS Dur :  94 ms       QT Int : 382 ms       P-R-T Axes :  37   2  80 degrees      QTc Int : 448 ms      Sinus rhythm with frequent and consecutive premature ventricular complexes   Abnormal ECG   No previous ECGs available      Referred By: ED MD           Confirmed By:             RADIOLOGY:   [x] Radiologist's Report Reviewed:  XR Chest 1 View   Final Result   Impression:   No acute cardiopulmonary findings.         Electronically Signed: Jak Morrison MD     7/22/2024 2:15 PM EDT     Workstation ID: [id number]          I ordered and independently reviewed the above noted radiographic studies.        LABS:    I have reviewed and interpreted all of the currently available lab results from this visit (if applicable):  Results for orders placed or performed during the hospital encounter of 07/22/24   High Sensitivity Troponin T    Specimen: Blood   Result Value Ref Range    HS  Troponin T 24 (H) <22 ng/L   Comprehensive Metabolic Panel    Specimen: Blood   Result Value Ref Range    Glucose 164 (H) 65 - 99 mg/dL    BUN 16 8 - 23 mg/dL    Creatinine 1.06 0.76 - 1.27 mg/dL    Sodium 141 136 - 145 mmol/L    Potassium 3.8 3.5 - 5.2 mmol/L    Chloride 104 98 - 107 mmol/L    CO2 24.0 22.0 - 29.0 mmol/L    Calcium 9.5 8.6 - 10.5 mg/dL    Total Protein 6.8 6.0 - 8.5 g/dL    Albumin 4.5 3.5 - 5.2 g/dL    ALT (SGPT) 22 1 - 41 U/L    AST (SGOT) 30 1 - 40 U/L    Alkaline Phosphatase 81 39 - 117 U/L    Total Bilirubin 0.5 0.0 - 1.2 mg/dL    Globulin 2.3 gm/dL    A/G Ratio 2.0 g/dL    BUN/Creatinine Ratio 15.1 7.0 - 25.0    Anion Gap 13.0 5.0 - 15.0 mmol/L    eGFR 77.9 >60.0 mL/min/1.73   Lipase    Specimen: Blood   Result Value Ref Range    Lipase 31 13 - 60 U/L   BNP    Specimen: Blood   Result Value Ref Range    proBNP 148.2 0.0 - 900.0 pg/mL   CBC Auto Differential    Specimen: Blood   Result Value Ref Range    WBC 6.42 3.40 - 10.80 10*3/mm3    RBC 4.76 4.14 - 5.80 10*6/mm3    Hemoglobin 14.9 13.0 - 17.7 g/dL    Hematocrit 44.4 37.5 - 51.0 %    MCV 93.3 79.0 - 97.0 fL    MCH 31.3 26.6 - 33.0 pg    MCHC 33.6 31.5 - 35.7 g/dL    RDW 12.6 12.3 - 15.4 %    RDW-SD 43.4 37.0 - 54.0 fl    MPV 11.4 6.0 - 12.0 fL    Platelets 177 140 - 450 10*3/mm3    Neutrophil % 50.2 42.7 - 76.0 %    Lymphocyte % 35.0 19.6 - 45.3 %    Monocyte % 10.4 5.0 - 12.0 %    Eosinophil % 3.1 0.3 - 6.2 %    Basophil % 0.8 0.0 - 1.5 %    Immature Grans % 0.5 0.0 - 0.5 %    Neutrophils, Absolute 3.22 1.70 - 7.00 10*3/mm3    Lymphocytes, Absolute 2.25 0.70 - 3.10 10*3/mm3    Monocytes, Absolute 0.67 0.10 - 0.90 10*3/mm3    Eosinophils, Absolute 0.20 0.00 - 0.40 10*3/mm3    Basophils, Absolute 0.05 0.00 - 0.20 10*3/mm3    Immature Grans, Absolute 0.03 0.00 - 0.05 10*3/mm3    nRBC 0.0 0.0 - 0.2 /100 WBC   TSH    Specimen: Blood   Result Value Ref Range    TSH 1.800 0.270 - 4.200 uIU/mL   T4, Free    Specimen: Blood   Result Value Ref  Range    Free T4 1.43 0.92 - 1.68 ng/dL   High Sensitivity Troponin T 2Hr    Specimen: Blood   Result Value Ref Range    HS Troponin T 57 (C) <22 ng/L    Troponin T Delta 33 (C) >=-4 - <+4 ng/L   ECG 12 Lead ED Triage Standing Order; Chest Pain   Result Value Ref Range    QT Interval 382 ms    QTC Interval 448 ms   ECG 12 Lead ED Triage Standing Order; Chest Pain   Result Value Ref Range    QT Interval 404 ms    QTC Interval 416 ms   Green Top (Gel)   Result Value Ref Range    Extra Tube Hold for add-ons.    Lavender Top   Result Value Ref Range    Extra Tube hold for add-on    Gold Top - SST   Result Value Ref Range    Extra Tube Hold for add-ons.    Gray Top   Result Value Ref Range    Extra Tube Hold for add-ons.    Light Blue Top   Result Value Ref Range    Extra Tube Hold for add-ons.         If labs were ordered, I independently reviewed the results and considered them in treating the patient.      EMERGENCY DEPARTMENT COURSE and DIFFERENTIAL DIAGNOSIS/MDM:   Vitals:  AS OF 17:53 EDT    BP - 143/98  HR - 65  TEMP - 98.6 °F (37 °C) (Oral)  O2 SATS - 98%        Discussion below represents my analysis of pertinent findings related to patient's condition, differential diagnosis, treatment plan and final disposition.      Differential diagnosis:  The differential diagnosis associated with the patient's presentation includes: SVT, A-fib, a flutter, ACS, electrolyte derangement, anemia, thyroid abnormality      Independent interpretations (ECG/rhythm strip/X-ray/US/CT scan): I independently interpreted the patient's chest x-ray and cardiac monitor.  There is no pulmonary infiltrate and the patient is in sinus rhythm.      Additional sources:  Discussed/obtained information from independent historians:   [] Spouse:   [] Parent:   [] Friend:   [x] EMS: Report was taken from EMS and is as noted in the HPI.   [] Other:  External (non-ED) record review:   [] Inpatient record:   [] Office record:   [] Outpatient  record:   [] Prior Outpatient labs:   [] Prior Outpatient radiology:   [] Primary Care record:   [] Outside ED record:   [x] Other: I reviewed ECG provided by EMS.  Patient had supraventricular tachycardia with rate of 205.      Patient's care impacted by:   [] Diabetes   [x] Hypertension   [] Coronary Artery Disease   [] Cancer   [x] Other: Hyperlipidemia    Care significantly affected by Social Determinants of Health (housing and economic circumstances, unemployment)    [] Yes     [x] No   If yes, Patient's care significantly limited by  Social Determinants of Health including:    [] Inadequate housing    [] Low income    [] Alcoholism and drug addiction in family    [] Problems related to primary support group    [] Unemployment    [] Problems related to employment    [] Other Social Determinants of Health:       Consideration of admission/observation vs discharge: Considered observation admission, however based on my assessment of the patient's presentation as well as consultation with cardiology, felt that patient's troponin elevation was likely secondary to dysrhythmia and not ACS and therefore felt that he was stable for discharge home with outpatient follow-up.      ED Course:    ED Course as of 07/22/24 1753 Mon Jul 22, 2024 1721 Discussed case with cardiology Dr. Penn.  I discussed patient history, presentation, workup.  Discussed troponin elevation with positive delta.  He has personally reviewed the EKGs and feels that they are nonischemic.  Agrees that his troponin elevation is likely due to his dysrhythmia prior to arrival.  Feels that he can go home with a prescription for metoprolol 25 mg twice daily and can follow-up with the heart and valve clinic. [NS]   1752 On reexamination, the patient remains completely asymptomatic.  Normal sinus rhythm on the monitor.  No laboratory evidence of electrolyte derangement, significant anemia, thyroid abnormality.  Elevated troponin on blood work, however  suspect this is secondary to the patient's dysrhythmia and not acute coronary syndrome.  Discussed this with cardiology who agreed.  Will start the patient on metoprolol per their recommendation and have him follow-up with the heart and valve clinic. [NS]      ED Course User Index  [NS] José Miguel Saunders MD             I had a discussion with the patient/family regarding diagnosis, diagnostic results, treatment plan, and medications.  The patient/family indicated understanding of these instructions.  I spent adequate time at the bedside preceding discharge necessary to personally discuss the aftercare instructions, giving patient education, providing explanations of the results of our evaluations/findings, and my decision making to assure that the patient/family understand the plan of care.  Time was allotted to answer questions at that time and throughout the ED course.  Emphasis was placed on timely follow-up after discharge.  I also discussed the potential for the development of an acute emergent condition requiring further evaluation, admission, or even surgical intervention. I discussed that we found nothing during the visit today indicating the need for further workup, admission, or the presence of an unstable medical condition.  I encouraged the patient to return to the emergency department immediately for ANY concerns, worsening, new complaints, or if symptoms persist and unable to seek follow-up in a timely fashion.  The patient/family expressed understanding and agreement with this plan.  The patient will follow-up with their PCP in 1-2 days for reevaluation.           PROCEDURES:  Procedures    CRITICAL CARE TIME        FINAL IMPRESSION      1. Supraventricular tachycardia    2. Elevated troponin    3. History of hypertension          DISPOSITION/PLAN     ED Disposition       ED Disposition   Discharge    Condition   Stable    Comment   --                 Comment: Please note this report has been  produced using speech recognition software.      José Miguel Saunders MD  Attending Emergency Physician             José Miguel Saunders MD  07/22/24 8124

## 2024-07-23 ENCOUNTER — TELEPHONE (OUTPATIENT)
Dept: CARDIOLOGY | Facility: CLINIC | Age: 64
End: 2024-07-23
Payer: MEDICARE

## 2024-07-23 LAB
QT INTERVAL: 404 MS
QTC INTERVAL: 416 MS

## 2024-07-23 NOTE — TELEPHONE ENCOUNTER
"  Caller: Orlando Meraz \"Td\"    Relationship to patient: Self    Best call back number: [phone]    New or established patient?  [] New  [x] Established    Date of discharge: 07/22/24    Facility discharged from: [facility]    Diagnosis/Symptoms: CHEST PAIN, HIGH HR    Length of stay (If applicable): ER VISIT    Specialty Only: Did you see a [facility] provider?    [] Yes  [x] No  If so, who?       PT WAS REFERRED TO [facility], ADVISED PT THAT HE WILL GET IN QUICKER THERE AND DR. JON WILL SEE ALL NOTES. PT WOULD STILL LIKE HFU APPT WITH DR. JON REGARDING ER VISIT. PLEASE CALL PATIENT BACK TO  ADVISE, THANK YOU.  "

## 2024-07-26 ENCOUNTER — OFFICE VISIT (OUTPATIENT)
Dept: CARDIOLOGY | Facility: HOSPITAL | Age: 64
End: 2024-07-26
Payer: MEDICARE

## 2024-07-26 ENCOUNTER — HOSPITAL ENCOUNTER (OUTPATIENT)
Dept: CARDIOLOGY | Facility: HOSPITAL | Age: 64
Discharge: HOME | End: 2024-07-26
Payer: MEDICARE

## 2024-07-26 VITALS
RESPIRATION RATE: 18 BRPM | OXYGEN SATURATION: 96 % | WEIGHT: 222.6 LBS | HEIGHT: 76 IN | HEART RATE: 68 BPM | DIASTOLIC BLOOD PRESSURE: 78 MMHG | TEMPERATURE: 97.6 F | BODY MASS INDEX: 27.11 KG/M2 | SYSTOLIC BLOOD PRESSURE: 130 MMHG

## 2024-07-26 DIAGNOSIS — R42 DIZZINESS: ICD-10-CM

## 2024-07-26 DIAGNOSIS — R00.2 PALPITATIONS: Primary | ICD-10-CM

## 2024-07-26 DIAGNOSIS — R00.2 PALPITATIONS: ICD-10-CM

## 2024-07-26 DIAGNOSIS — I25.10 CORONARY ARTERY DISEASE INVOLVING NATIVE CORONARY ARTERY OF NATIVE HEART WITHOUT ANGINA PECTORIS: ICD-10-CM

## 2024-07-26 DIAGNOSIS — I10 PRIMARY HYPERTENSION: ICD-10-CM

## 2024-07-26 PROCEDURE — 93242 EXT ECG>48HR<7D RECORDING: CPT

## 2024-07-26 NOTE — PROGRESS NOTES
"[facility], [facility]    Chief Complaint  Rapid Heart Rate    Subjective    History of Present Illness {CC  Problem List  Visit  Diagnosis   Encounters  Notes  Medications  Labs  Result Review Imaging  Media :23}     Orlando Meraz presents to [facility] CARDIOLOG[facility] for   History of Present Illness     63-year-old patient with CAD, dyslipidemia, HTN.      In 2022 patient had a coronary artery calcium score of 233, moderate coronary artery calcification.  Patient continued on aspirin, statin.    Patient presented to [facility] ED on 7/22/2024 with chest pain described as tightness, racing heart rate associated with paresthesias in both elbows.  Noted after finishing a walk.  Patient's heart rate per EMS was between 2 15-2 25 upon arrival.  Patient spontaneously converted to sinus rhythm per report.    Patient was started on metoprolol.    Troponin was elevated, felt probable related to unclear arrhythmia.     Pt denies Exertional CP, pressure, change in activity tolerance. Dizziness, near syncope, syncope    Pt reports having similar s/s years ago after increase caffeine intake. Pt has caffeine on day of ED visit as well.     Overall feeling well no concerns today.         Objective     Vital Signs:   Vitals:    07/26/24 1342 07/26/24 1343 07/26/24 1409   BP: (!) 158/101 (!) 167/104 130/78   BP Location: Left arm Left arm    Patient Position: Standing Sitting    Cuff Size: Adult Adult    Pulse: 63 68    Resp:  18    Temp: 97.6 °F (36.4 °C) 97.6 °F (36.4 °C)    TempSrc: Temporal Temporal    SpO2: 95% 96%    Weight:  101 kg (222 lb 9.6 oz)    Height:  193 cm (76\")      Body mass index is 27.1 kg/m².  Physical Exam  Vitals reviewed.   Constitutional:       General: He is not in acute distress.  Cardiovascular:      Rate and Rhythm: Normal rate and regular rhythm.      Heart sounds: No murmur heard.  Pulmonary:      Effort: Pulmonary " "effort is normal.      Breath sounds: Normal breath sounds.   Musculoskeletal:      Right lower leg: No edema.      Left lower leg: No edema.   Skin:     Coloration: Skin is not pale.   Neurological:      Mental Status: He is alert.   Psychiatric:         Mood and Affect: Mood normal.         Behavior: Behavior normal. Behavior is cooperative.              Result Review  Data Reviewed:{ Labs  Result Review  Imaging  Med Tab  Media :23}   8/2022 calcium score 233.7 (LAD 84.6, .9, RCA 10.1), moderate elevated calcium score.              Assessment and Plan {CC Problem List  Visit Diagnosis  ROS  Review (Popup)  Pie Digital Maintenance  Quality  BestPractice  Medications  SmartSets  SnapShot Encounters  Media :23}   1. Palpitations  Probable SVT.  Continue beta-blocker.  Cardiac monitor to assess arrhythmia.    - Holter Monitor - 72 Hour Up To 15 Days; Future  - Adult Transthoracic Echo Complete W/ Cont if Necessary Per Protocol; Future    2. Coronary artery disease involving native coronary artery of native heart without angina pectoris  No exertional chest pain, pressure, dyspnea.  Patient with known CAD.  Aspirin, statin.  If symptoms change or worsen will consider stress test.  At this time we will complete echocardiogram and cardiac heart monitor.    3. Primary hypertension  Blood pressure elevated initially.  Repeat showed improvement.  Patient was diagnosed with \"whitecoat syndrome\"  Continue lisinopril, metoprolol.  4. Dizziness    - Holter Monitor - 72 Hour Up To 15 Days; Future  - Adult Transthoracic Echo Complete W/ Cont if Necessary Per Protocol; Future    Patient would like to follow-up with Dr. Vásquez for continued management.  Will reach out to cardiology to schedule in 6 to 8 weeks.  Patient encouraged to call the heart valve center sooner if anything changes or has any concerns.        Follow Up {Instructions Charge Capture  Follow-up Communications :23}   Return if symptoms worsen " or fail to improve.    Patient was given instructions and counseling regarding his condition or for health maintenance advice. Please see specific information pulled into the AVS if appropriate.  Patient was instructed to call the Heart and Valve Center with any questions, concerns, or worsening symptoms.

## 2024-07-26 NOTE — PROGRESS NOTES
[facility] Heart Monitor Documentation    Orlando Meraz  1960  [id number]  07/26/24      [] ZIO XT Patch  Model [id number] Prescribed for  Days    Serial Number: (N + 9 Digits) [id number]  Apply-By Date on Box: 10/07/2024  [employer] Tracking Number: [id number]  [employer] Tracking        [] [employer] MINI PLUS Mobile Cardiac Telemetry  Model BGMINIPLUS Prescribed for  Days    Serial Number: (BGM + 7 Digits) BGM  Shipped-By Date on Box:   UPS Tracking Number: [id number]  UPS Tracking      [] [employer] MINI Holter Monitor  Model BGMINIEL Prescribed for  Days    Serial Number: (7 Digits)   Shipped-By Date on Box:   UPS Tracking Number: [id number]  UPS Tracking        This monitor was applied to the patient's chest and checked for proper functioning.  Mr. Orlando Meraz was instructed in the proper use of this monitor.  He was given the opportunity to ask questions and left the office with the device 's instruction manual.    Paty Alan, SAROJ, 14:07 EDT, 07/26/24                  [facility] Center[facility] 8.8.2019

## 2024-07-29 ENCOUNTER — HOSPITAL ENCOUNTER (OUTPATIENT)
Dept: CARDIOLOGY | Facility: HOSPITAL | Age: 64
Discharge: HOME | End: 2024-07-29
Admitting: NURSE PRACTITIONER
Payer: MEDICARE

## 2024-07-29 DIAGNOSIS — R42 DIZZINESS: ICD-10-CM

## 2024-07-29 DIAGNOSIS — R00.2 PALPITATIONS: ICD-10-CM

## 2024-07-29 PROCEDURE — 93306 TTE W/DOPPLER COMPLETE: CPT | Performed by: INTERNAL MEDICINE

## 2024-07-29 PROCEDURE — 93306 TTE W/DOPPLER COMPLETE: CPT

## 2024-07-30 LAB
BH CV ECHO LEFT VENTRICLE GLOBAL LONGITUDINAL STRAIN: -23.1 %
BH CV ECHO MEAS - AO ROOT DIAM: 4 CM
BH CV ECHO MEAS - EF(MOD-BP): 56.6 %
BH CV ECHO MEAS - IVS/LVPW: 1.08 CM
BH CV ECHO MEAS - IVSD: 1.4 CM
BH CV ECHO MEAS - LA DIMENSION: 3.7 CM
BH CV ECHO MEAS - LV MAX PG: 3 MMHG
BH CV ECHO MEAS - LV MEAN PG: 1.33 MMHG
BH CV ECHO MEAS - LV V1 MAX: 85.8 CM/SEC
BH CV ECHO MEAS - LV V1 VTI: 21.7 CM
BH CV ECHO MEAS - LVIDD: 4.8 CM
BH CV ECHO MEAS - LVIDS: 3.7 CM
BH CV ECHO MEAS - LVOT DIAM: 2.3 CM
BH CV ECHO MEAS - LVPWD: 1.3 CM
BH CV ECHO MEAS - MV A MAX VEL: 80 CM/SEC
BH CV ECHO MEAS - MV E MAX VEL: 69.6 CM/SEC
BH CV ECHO MEAS - MV E/A: 0.87
BH CV ECHO MEAS - MV MAX PG: 2.6 MMHG
BH CV ECHO MEAS - MV P1/2T: 64 MSEC
BH CV ECHO MEAS - PA ACC TIME: 0.18 SEC
BH CV ECHO MEAS - PI END-D VEL: 105.4 CM/SEC
BH CV ECHO MEAS - RAP SYSTOLE: 8 MMHG
BH CV ECHO MEAS - RVSP: 24 MMHG
BH CV ECHO MEAS - TAPSE (>1.6): 1.64 CM
BH CV ECHO MEAS - TR MAX PG: 15.8 MMHG
BH CV ECHO MEAS - TR MAX VEL: 196 CM/SEC
BH CV XLRA - RV BASE: 4.8 CM
BH CV XLRA - RV LENGTH: 6 CM
BH CV XLRA - RV MID: 2.6 CM
BH CV XLRA - TDI S': 14.7 CM/SEC
LV EF 2D ECHO EST: 60 %

## 2024-08-26 ENCOUNTER — OFFICE VISIT (OUTPATIENT)
Dept: CARDIOLOGY | Facility: CLINIC | Age: 64
End: 2024-08-26
Payer: MEDICARE

## 2024-08-26 ENCOUNTER — PATIENT ROUNDING (BHMG ONLY) (OUTPATIENT)
Dept: CARDIOLOGY | Facility: CLINIC | Age: 64
End: 2024-08-26
Payer: MEDICARE

## 2024-08-26 VITALS
SYSTOLIC BLOOD PRESSURE: 148 MMHG | OXYGEN SATURATION: 98 % | WEIGHT: 224 LBS | BODY MASS INDEX: 27.28 KG/M2 | HEIGHT: 76 IN | DIASTOLIC BLOOD PRESSURE: 88 MMHG | HEART RATE: 55 BPM

## 2024-08-26 DIAGNOSIS — E78.5 DYSLIPIDEMIA: ICD-10-CM

## 2024-08-26 DIAGNOSIS — I10 ESSENTIAL HYPERTENSION: ICD-10-CM

## 2024-08-26 DIAGNOSIS — I48.91 ATRIAL FIBRILLATION, UNSPECIFIED TYPE: ICD-10-CM

## 2024-08-26 DIAGNOSIS — I25.10 CORONARY ARTERY DISEASE INVOLVING NATIVE CORONARY ARTERY OF NATIVE HEART WITHOUT ANGINA PECTORIS: Primary | ICD-10-CM

## 2024-08-26 PROCEDURE — 1159F MED LIST DOCD IN RCRD: CPT | Performed by: INTERNAL MEDICINE

## 2024-08-26 PROCEDURE — 3079F DIAST BP 80-89 MM HG: CPT | Performed by: INTERNAL MEDICINE

## 2024-08-26 PROCEDURE — 99214 OFFICE O/P EST MOD 30 MIN: CPT | Performed by: INTERNAL MEDICINE

## 2024-08-26 PROCEDURE — 3077F SYST BP >= 140 MM HG: CPT | Performed by: INTERNAL MEDICINE

## 2024-08-26 PROCEDURE — 1160F RVW MEDS BY RX/DR IN RCRD: CPT | Performed by: INTERNAL MEDICINE

## 2024-08-26 NOTE — PROGRESS NOTES
August 26, 2024    Hello, may I speak with Orlando Meraz?YES    My name is MICHAEL MOORE      I am  with [facility] CARDIOLOG[facility]  [address]  [place] [address] [phone].    Before we get started may I verify your date of birth? 1960YES    I am calling to officially welcome you to our practice and ask about your recent visit. Is this a good time to talk? yes    Tell me about your visit with us. What things went well?  EVERYTHING, NO COMPLAINTS AT ALL.       We're always looking for ways to make our patients' experiences even better. Do you have recommendations on ways we may improve?  no    Overall were you satisfied with your first visit to our practice? yes       I appreciate you taking the time to speak with me today. Is there anything else I can do for you? no      Thank you, and have a great day.

## 2024-08-26 NOTE — PROGRESS NOTES
[facility]  Subjective:     Encounter Date: 08/26/2024      Patient ID: Orlando Meraz is a 63 y.o. male.    Chief Complaint: Elevated coronary artery calcium score      PROBLEM LIST:  CAD  8/2022 calcium score 233.7 (LAD 84.6, .9, RCA 10.1)  Echo, 07/29/2024: EF 60%. Normal valves.   Atrial fibrillation  Symptomatic RVR self terminating and rescue squad 7/24.    Holter, 07/26/2024: An abnormal monitor study. Multiple brief episodes of SVT/atrial fibrillation. Rates 130-190 BPM.  Hypertension  Dyslipidemia  GERD  Surgeries:  Vasectomy       History of Present Illness  Orlando Meraz returns today for a follow up with a history of CAD and cardiac risk factors. Since last visit, a couple weeks ago after returning from a walk he noticed dizziness and palpitations. He ate and had some fluids before calling EMS where he was taken to the ED. While in the ambulance, his heart rate was noted to be 215-225 bpm. By the time he arrived at the ED, he has spontaneously converted back to SR. Patient was seen by WHITLEY Martínez where he was started on metoprolol for better rate control. He states he has felt better since starting metoprolol and has not had reoccurring symptoms. Patient notes frequent back pain that he has previously gone to physical therapy for. He stays busy and active by walking 3 to 5 miles daily. Patient reports spots of skin cancer being removed from his face. He denies chest pain, shortness of breath, orthopnea, edema, and syncope.     No Known Allergies      Current Outpatient Medications:     coenzyme Q10 100 MG capsule, Take 1 capsule by mouth Daily., Disp: , Rfl:     fluticasone (FLONASE) 50 MCG/ACT nasal spray, into each nostril Daily As Needed., Disp: , Rfl:     lisinopril (PRINIVIL,ZESTRIL) 40 MG tablet, TAKE 1 TABLET BY MOUTH DAILY, Disp: 90 tablet, Rfl: 1    metFORMIN (GLUCOPHAGE) 1000 MG tablet, Take 1 tablet by mouth 2 (Two) Times a  "Day With Meals., Disp: 180 tablet, Rfl: 1    metoprolol tartrate (LOPRESSOR) 25 MG tablet, Take 1 tablet by mouth 2 (Two) Times a Day for 30 days., Disp: 60 tablet, Rfl: 0    rosuvastatin (CRESTOR) 20 MG tablet, TAKE 1 TABLET BY MOUTH DAILY, Disp: 90 tablet, Rfl: 1    tadalafil (CIALIS) 5 MG tablet, TAKE 1 TABLET BY MOUTH DAILY, Disp: 90 tablet, Rfl: 1    Testosterone 20.25 MG/1.25GM (1.62%) gel, APPLY 1 PACKET TO THE SKIN DAILY, Disp: 37.5 g, Rfl: 2    Testosterone 20.25 MG/ACT (1.62%) gel, Place 1.25 g on the skin as directed by provider Daily. (Patient not taking: Reported on 7/26/2024), Disp: 75 g, Rfl: 2    The following portions of the patient's history were reviewed and updated as appropriate: allergies, current medications, past family history, past medical history, past social history, past surgical history and problem list.    ROS       Objective:     Vitals:    08/26/24 0928   BP: 148/88   BP Location: Left arm   Patient Position: Sitting   Cuff Size: Adult   Pulse: 55   SpO2: 98%   Weight: 102 kg (224 lb)   Height: 193 cm (76\")         Vitals reviewed.   Constitutional:       Appearance: Well-developed and not in distress.   Neck:      Thyroid: No thyromegaly.      Vascular: No carotid bruit or JVD.   Pulmonary:      Breath sounds: Normal breath sounds.   Cardiovascular:      Regular rhythm.      No gallop. No S3 and S4 gallop.   Pulses:     Intact distal pulses.      Carotid: 2+ bilaterally.     Radial: 2+ bilaterally.  Edema:     Peripheral edema absent.   Abdominal:      General: Bowel sounds are normal.      Palpations: Abdomen is soft. There is no abdominal mass.      Tenderness: There is no abdominal tenderness.   Musculoskeletal:         General: No deformity.      Extremities: No clubbing present.Skin:     General: Skin is warm and dry.      Findings: No rash.   Neurological:      Mental Status: Alert and oriented to person, place, and time.         Lab Review:  Lab Results   Component Value Date "    GLUCOSE 164 (H) 07/22/2024    BUN 16 07/22/2024    CREATININE 1.06 07/22/2024    BCR 15.1 07/22/2024    K 3.8 07/22/2024    CO2 24.0 07/22/2024    CALCIUM 9.5 07/22/2024    ALBUMIN 4.5 07/22/2024    ALKPHOS 81 07/22/2024    AST 30 07/22/2024    ALT 22 07/22/2024     Lab Results   Component Value Date    CHOL 149 05/08/2024    TRIG 161 (H) 05/08/2024    HDL 42 05/08/2024    LDL 79 05/08/2024      Lab Results   Component Value Date    WBC 6.42 07/22/2024    RBC 4.76 07/22/2024    HGB 14.9 07/22/2024    HCT 44.4 07/22/2024    MCV 93.3 07/22/2024     07/22/2024     Lab Results   Component Value Date    TSH 1.800 07/22/2024     Lab Results   Component Value Date    HGBA1C 9.30 (H) 05/08/2024        Procedures      Advance Care Planning   ACP discussion was held with the patient during this visit. Patient does not have an advance directive, declines further assistance.           Assessment:   Diagnoses and all orders for this visit:    1. Coronary artery disease involving native coronary artery of native heart without angina pectoris (Primary)    2. Atrial fibrillation, unspecified type    3. Essential hypertension    4. Dyslipidemia        Impression:  1. Coronary artery disease. Stable without angina on current activity. Discontinue aspirin 81 mg daily.     2. Atrial fibrillation.  Newly diagnosis.  Stable and asymptomatic. Start on Eliquis 5 mg BID for stroke prophylaxis. Continue on metoprolol 25 mg BID for rate control and hypertension.     3. Essential hypertension. Elevated. Continue on lisinopril 40 mg daily for hypertension.     4. Dyslipidemia. Well controlled. Continue on rosuvastatin 20 mg daily for hyperlipidemia.     Plan:  Stable cardiac status.   Start on Eliquis 5 mg BID for stroke prophylaxis.   Discontinue aspirin 81 mg daily.   Continue current medications.  Discussed A-fib and treatment options with patient.  As he is asymptomatic with his brief episodes at this time we will simply do rate  control and anticoagulation.  Revisit in 12 MO, or sooner as needed.          Scribed for Federico Vásquez MD by Lorelei Madden. 8/26/2024 10:04 EDT  Federico Vásquez MD    Please note that portions of this note may have been completed with a voice recognition program. Efforts were made to edit the dictations, but occasionally words are mistranscribed.

## 2024-08-27 RX ORDER — ROSUVASTATIN CALCIUM 20 MG/1
20 TABLET, COATED ORAL DAILY
Qty: 90 TABLET | Refills: 1 | Status: SHIPPED | OUTPATIENT
Start: 2024-08-27

## 2024-09-03 RX ORDER — TESTOSTERONE 20.25 MG/1.25G
GEL TOPICAL
Qty: 37.5 G | Refills: 2 | Status: SHIPPED | OUTPATIENT
Start: 2024-09-03

## 2024-09-05 RX ORDER — METOPROLOL TARTRATE 25 MG/1
25 TABLET, FILM COATED ORAL 2 TIMES DAILY
Qty: 180 TABLET | Refills: 3 | Status: SHIPPED | OUTPATIENT
Start: 2024-09-05

## 2024-09-17 ENCOUNTER — OFFICE VISIT (OUTPATIENT)
Dept: INTERNAL MEDICINE | Facility: CLINIC | Age: 64
End: 2024-09-17
Payer: MEDICARE

## 2024-09-17 VITALS
RESPIRATION RATE: 16 BRPM | WEIGHT: 226 LBS | TEMPERATURE: 98 F | SYSTOLIC BLOOD PRESSURE: 120 MMHG | HEART RATE: 56 BPM | BODY MASS INDEX: 27.51 KG/M2 | DIASTOLIC BLOOD PRESSURE: 64 MMHG

## 2024-09-17 DIAGNOSIS — E78.5 HYPERLIPIDEMIA, UNSPECIFIED HYPERLIPIDEMIA TYPE: ICD-10-CM

## 2024-09-17 DIAGNOSIS — E29.1 HYPOGONADISM IN MALE: ICD-10-CM

## 2024-09-17 DIAGNOSIS — E11.9 TYPE 2 DIABETES MELLITUS WITHOUT COMPLICATION, WITHOUT LONG-TERM CURRENT USE OF INSULIN: ICD-10-CM

## 2024-09-17 DIAGNOSIS — K21.9 GASTROESOPHAGEAL REFLUX DISEASE WITHOUT ESOPHAGITIS: ICD-10-CM

## 2024-09-17 DIAGNOSIS — I10 ESSENTIAL HYPERTENSION: Primary | ICD-10-CM

## 2024-09-17 LAB
ALBUMIN SERPL-MCNC: 4.7 G/DL (ref 3.5–5.2)
ALBUMIN/CREATININE RATIO, URINE: <30
ALBUMIN/GLOB SERPL: 2 G/DL
ALP SERPL-CCNC: 74 U/L (ref 39–117)
ALT SERPL W P-5'-P-CCNC: 25 U/L (ref 1–41)
ANION GAP SERPL CALCULATED.3IONS-SCNC: 11 MMOL/L (ref 5–15)
AST SERPL-CCNC: 31 U/L (ref 1–40)
BASOPHILS # BLD AUTO: 0.04 10*3/MM3 (ref 0–0.2)
BASOPHILS NFR BLD AUTO: 0.6 % (ref 0–1.5)
BILIRUB BLD-MCNC: NEGATIVE MG/DL
BILIRUB SERPL-MCNC: 0.8 MG/DL (ref 0–1.2)
BUN SERPL-MCNC: 17 MG/DL (ref 8–23)
BUN/CREAT SERPL: 17.7 (ref 7–25)
CALCIUM SPEC-SCNC: 9.8 MG/DL (ref 8.6–10.5)
CHLORIDE SERPL-SCNC: 103 MMOL/L (ref 98–107)
CHOLEST SERPL-MCNC: 148 MG/DL (ref 0–200)
CLARITY, POC: CLEAR
CO2 SERPL-SCNC: 25 MMOL/L (ref 22–29)
COLOR UR: YELLOW
CREAT SERPL-MCNC: 0.96 MG/DL (ref 0.76–1.27)
DEPRECATED RDW RBC AUTO: 40.6 FL (ref 37–54)
EGFRCR SERPLBLD CKD-EPI 2021: 87.2 ML/MIN/1.73
EOSINOPHIL # BLD AUTO: 0.14 10*3/MM3 (ref 0–0.4)
EOSINOPHIL NFR BLD AUTO: 1.9 % (ref 0.3–6.2)
ERYTHROCYTE [DISTWIDTH] IN BLOOD BY AUTOMATED COUNT: 12.3 % (ref 12.3–15.4)
EXPIRATION DATE: ABNORMAL
EXPIRATION DATE: NORMAL
GLOBULIN UR ELPH-MCNC: 2.3 GM/DL
GLUCOSE SERPL-MCNC: 170 MG/DL (ref 65–99)
GLUCOSE UR STRIP-MCNC: ABNORMAL MG/DL
HBA1C MFR BLD: 8.1 % (ref 4.8–5.6)
HCT VFR BLD AUTO: 44.8 % (ref 37.5–51)
HDLC SERPL-MCNC: 44 MG/DL (ref 40–60)
HGB BLD-MCNC: 15.2 G/DL (ref 13–17.7)
IMM GRANULOCYTES # BLD AUTO: 0.03 10*3/MM3 (ref 0–0.05)
IMM GRANULOCYTES NFR BLD AUTO: 0.4 % (ref 0–0.5)
KETONES UR QL: NEGATIVE
LDLC SERPL CALC-MCNC: 79 MG/DL (ref 0–100)
LDLC/HDLC SERPL: 1.72 {RATIO}
LEUKOCYTE EST, POC: NEGATIVE
LYMPHOCYTES # BLD AUTO: 1.88 10*3/MM3 (ref 0.7–3.1)
LYMPHOCYTES NFR BLD AUTO: 25.9 % (ref 19.6–45.3)
Lab: ABNORMAL
Lab: NORMAL
MCH RBC QN AUTO: 31 PG (ref 26.6–33)
MCHC RBC AUTO-ENTMCNC: 33.9 G/DL (ref 31.5–35.7)
MCV RBC AUTO: 91.2 FL (ref 79–97)
MONOCYTES # BLD AUTO: 0.64 10*3/MM3 (ref 0.1–0.9)
MONOCYTES NFR BLD AUTO: 8.8 % (ref 5–12)
NEUTROPHILS NFR BLD AUTO: 4.52 10*3/MM3 (ref 1.7–7)
NEUTROPHILS NFR BLD AUTO: 62.4 % (ref 42.7–76)
NITRITE UR-MCNC: NEGATIVE MG/ML
NRBC BLD AUTO-RTO: 0 /100 WBC (ref 0–0.2)
PH UR: 6 [PH] (ref 5–8)
PLATELET # BLD AUTO: 198 10*3/MM3 (ref 140–450)
PMV BLD AUTO: 11.2 FL (ref 6–12)
POC CREATININE URINE: 100
POC MICROALBUMIN URINE: 30
POTASSIUM SERPL-SCNC: 4.4 MMOL/L (ref 3.5–5.2)
PROT SERPL-MCNC: 7 G/DL (ref 6–8.5)
PROT UR STRIP-MCNC: NEGATIVE MG/DL
RBC # BLD AUTO: 4.91 10*6/MM3 (ref 4.14–5.8)
RBC # UR STRIP: NEGATIVE /UL
SODIUM SERPL-SCNC: 139 MMOL/L (ref 136–145)
SP GR UR: 1.01 (ref 1–1.03)
TRIGL SERPL-MCNC: 142 MG/DL (ref 0–150)
UROBILINOGEN UR QL: NORMAL
VLDLC SERPL-MCNC: 25 MG/DL (ref 5–40)
WBC NRBC COR # BLD AUTO: 7.25 10*3/MM3 (ref 3.4–10.8)

## 2024-09-17 PROCEDURE — 99214 OFFICE O/P EST MOD 30 MIN: CPT | Performed by: INTERNAL MEDICINE

## 2024-09-17 PROCEDURE — 84402 ASSAY OF FREE TESTOSTERONE: CPT | Performed by: INTERNAL MEDICINE

## 2024-09-17 PROCEDURE — 82044 UR ALBUMIN SEMIQUANTITATIVE: CPT | Performed by: INTERNAL MEDICINE

## 2024-09-17 PROCEDURE — 84403 ASSAY OF TOTAL TESTOSTERONE: CPT | Performed by: INTERNAL MEDICINE

## 2024-09-17 PROCEDURE — 1126F AMNT PAIN NOTED NONE PRSNT: CPT | Performed by: INTERNAL MEDICINE

## 2024-09-17 PROCEDURE — G2211 COMPLEX E/M VISIT ADD ON: HCPCS | Performed by: INTERNAL MEDICINE

## 2024-09-17 PROCEDURE — 83036 HEMOGLOBIN GLYCOSYLATED A1C: CPT | Performed by: INTERNAL MEDICINE

## 2024-09-17 PROCEDURE — 36415 COLL VENOUS BLD VENIPUNCTURE: CPT | Performed by: INTERNAL MEDICINE

## 2024-09-17 PROCEDURE — 1159F MED LIST DOCD IN RCRD: CPT | Performed by: INTERNAL MEDICINE

## 2024-09-17 PROCEDURE — 80061 LIPID PANEL: CPT | Performed by: INTERNAL MEDICINE

## 2024-09-17 PROCEDURE — 3046F HEMOGLOBIN A1C LEVEL >9.0%: CPT | Performed by: INTERNAL MEDICINE

## 2024-09-17 PROCEDURE — 3078F DIAST BP <80 MM HG: CPT | Performed by: INTERNAL MEDICINE

## 2024-09-17 PROCEDURE — 85025 COMPLETE CBC W/AUTO DIFF WBC: CPT | Performed by: INTERNAL MEDICINE

## 2024-09-17 PROCEDURE — 1160F RVW MEDS BY RX/DR IN RCRD: CPT | Performed by: INTERNAL MEDICINE

## 2024-09-17 PROCEDURE — 81003 URINALYSIS AUTO W/O SCOPE: CPT | Performed by: INTERNAL MEDICINE

## 2024-09-17 PROCEDURE — 80053 COMPREHEN METABOLIC PANEL: CPT | Performed by: INTERNAL MEDICINE

## 2024-09-17 PROCEDURE — 3074F SYST BP LT 130 MM HG: CPT | Performed by: INTERNAL MEDICINE

## 2024-09-22 LAB
TESTOST FREE SERPL-MCNC: 5.4 PG/ML (ref 6.6–18.1)
TESTOST SERPL-MCNC: 315 NG/DL (ref 264–916)

## 2024-10-04 ENCOUNTER — DOCUMENTATION (OUTPATIENT)
Dept: INTERNAL MEDICINE | Facility: CLINIC | Age: 64
End: 2024-10-04
Payer: MEDICARE

## 2024-10-04 RX ORDER — METHYLPREDNISOLONE 4 MG
TABLET, DOSE PACK ORAL
Qty: 21 TABLET | Refills: 0 | Status: SHIPPED | OUTPATIENT
Start: 2024-10-04

## 2024-10-08 ENCOUNTER — OFFICE VISIT (OUTPATIENT)
Dept: INTERNAL MEDICINE | Facility: CLINIC | Age: 64
End: 2024-10-08
Payer: MEDICARE

## 2024-10-08 ENCOUNTER — HOSPITAL ENCOUNTER (OUTPATIENT)
Dept: GENERAL RADIOLOGY | Facility: HOSPITAL | Age: 64
Discharge: HOME | End: 2024-10-08
Admitting: INTERNAL MEDICINE
Payer: MEDICARE

## 2024-10-08 VITALS
DIASTOLIC BLOOD PRESSURE: 90 MMHG | HEIGHT: 76 IN | WEIGHT: 216 LBS | OXYGEN SATURATION: 98 % | SYSTOLIC BLOOD PRESSURE: 134 MMHG | BODY MASS INDEX: 26.3 KG/M2 | HEART RATE: 68 BPM

## 2024-10-08 DIAGNOSIS — M54.41 ACUTE RIGHT-SIDED LOW BACK PAIN WITH RIGHT-SIDED SCIATICA: Primary | ICD-10-CM

## 2024-10-08 DIAGNOSIS — M54.41 ACUTE RIGHT-SIDED LOW BACK PAIN WITH RIGHT-SIDED SCIATICA: ICD-10-CM

## 2024-10-08 PROCEDURE — 72100 X-RAY EXAM L-S SPINE 2/3 VWS: CPT

## 2024-10-08 PROCEDURE — 1125F AMNT PAIN NOTED PAIN PRSNT: CPT | Performed by: INTERNAL MEDICINE

## 2024-10-08 PROCEDURE — 3080F DIAST BP >= 90 MM HG: CPT | Performed by: INTERNAL MEDICINE

## 2024-10-08 PROCEDURE — 3052F HG A1C>EQUAL 8.0%<EQUAL 9.0%: CPT | Performed by: INTERNAL MEDICINE

## 2024-10-08 PROCEDURE — 99213 OFFICE O/P EST LOW 20 MIN: CPT | Performed by: INTERNAL MEDICINE

## 2024-10-08 PROCEDURE — 3075F SYST BP GE 130 - 139MM HG: CPT | Performed by: INTERNAL MEDICINE

## 2024-10-08 RX ORDER — PREDNISONE 10 MG/1
TABLET ORAL
Qty: 37 TABLET | Refills: 0 | Status: SHIPPED | OUTPATIENT
Start: 2024-10-08

## 2024-10-08 NOTE — PROGRESS NOTES
11/25/20     Eli Alberto  [address] [facility] [id number]    Dear Eli Alberto,    This letter is to confirm that you have been scheduled for a Sotalol loading admission with Elias Carrizales MD at [facility] on Monday, January 4, 2021. The hospital is located at [address] [place] [address] in [place]. Use entrance A located on the front of the hospital.     Arrive for check in at 8:00 AM.   Bring your insurance card(s) with you.       We try our best to stay on schedule with our procedures, however due to the nature of our specialty emergencies come up frequently. We may need to change your check in date and/or time on short notice or  you may have to wait for these patients to be cared for as a priority.  We apologize in advance if there is a wait.  Thank you for your understanding.    You will be admitted to the Medical/Surgical Unit (on the 3rd Floor) for 3 days (2 night stay) or 5 doses of medication. When starting Sotalol there is a small chance that you could develop abnormal heart rhythms which can be dangerous. For this reason, it is important that we monitor you closely in the hospital for the first 5 doses. While you are in the hospital, you will be on a heart monitor.Electrolytes will be monitored closely and replaced intravenously, you will have an IV throughout your stay.      Important things to remember:     • You may eat and drink normally prior to check in.   • Starting on Friday, January 1st (3 days prior to your admission)) Reduce Metoprolol to take 25 mg twice daily, Do not take Metoprolol the morning of your admission.  • DO NOT bring your medications with you to the procedure.   • Admissions are typically quite uneventful, therefor we recommend bringing books, magazines, laptops, tablets, ETC. to help pass the time.   • If you use a CPAP or BiPAP machine, please bring with to be used throughout your stay.  • You will need some one to drive you home on the day you are  Chief Complaint   Patient presents with    Back Pain     States he has been having trouble with his sciatica for 1 week, states he was carrying heavy objects and since then his back has been hurting        History of Present Illness    The patient presents for a follow-up related to low back pain which began ten days ago. He states that the pain was first noted after lifting. He initially felt pain. The symptoms came on suddenly and have been progressive. The pain is not associated with fever, chills, weight loss, rashes, dysuria, hematuria or a decreased urine stream. The patient denies a past history of malignancy. The pain does not radiate. There is no numbness. The patient denies loss of bladder control. The patient denies loss of bowel control. The patient reports no aggravating factors.    Medications      Current Outpatient Medications:     coenzyme Q10 100 MG capsule, Take 2 capsules by mouth Daily., Disp: , Rfl:     fluticasone (FLONASE) 50 MCG/ACT nasal spray, into each nostril Daily As Needed., Disp: , Rfl:     lisinopril (PRINIVIL,ZESTRIL) 40 MG tablet, TAKE 1 TABLET BY MOUTH DAILY, Disp: 90 tablet, Rfl: 1    metFORMIN (GLUCOPHAGE) 1000 MG tablet, Take 1 tablet by mouth 2 (Two) Times a Day With Meals., Disp: 180 tablet, Rfl: 1    methylPREDNISolone (MEDROL) 4 MG dose pack, Take as directed on package instructions., Disp: 21 tablet, Rfl: 0    metoprolol tartrate (LOPRESSOR) 25 MG tablet, Take 1 tablet by mouth 2 (Two) Times a Day. (Patient taking differently: Take 1 tablet by mouth 2 (Two) Times a Day. 9/17/24 Reports taking once daily in am), Disp: 180 tablet, Rfl: 3    rosuvastatin (CRESTOR) 20 MG tablet, TAKE 1 TABLET BY MOUTH DAILY, Disp: 90 tablet, Rfl: 1    tadalafil (CIALIS) 5 MG tablet, TAKE 1 TABLET BY MOUTH DAILY, Disp: 90 tablet, Rfl: 1    Testosterone 20.25 MG/1.25GM (1.62%) gel, APPLY 1 PACKET TO THE SKIN DAILY, Disp: 37.5 g, Rfl: 2    predniSONE (DELTASONE) 10 MG tablet, 2 po bid x 5 days; 1  "po bid x 5 days; 1 po daily x 5 days; 1/2 po daily x 4 days, Disp: 37 tablet, Rfl: 0    rivaroxaban (Xarelto) 20 MG tablet, Take 1 tablet by mouth Daily With Dinner. (Patient not taking: Reported on 10/8/2024), Disp: 30 tablet, Rfl: 11     Allergies    No Known Allergies    Problem List    Patient Active Problem List   Diagnosis    Diabetes mellitus    Dyslipidemia    Essential hypertension    Gastroesophageal reflux disease    Enlarged prostate without lower urinary tract symptoms (luts)    Erectile dysfunction of nonorganic origin    Acute bronchitis    Atopic rhinitis    Liver cyst    Coronary artery disease involving native coronary artery of native heart without angina pectoris       Medications, Allergies, Problems List and Past History were reviewed and updated.    Physical Examination    /90   Pulse 68   Ht 193 cm (76\")   Wt 98 kg (216 lb)   SpO2 98%   BMI 26.29 kg/m²       Abdomen: Soft, benign, non-tender with no masses, hernias, organomegaly or scars.    Back: The patient has a normal spinal curvature with no evidence of scoliosis. There are no skin lesions noted on the back. Palpation reveals no tenderness and normal muscle tone. The straight leg raising test is negative. The back has normal flexion, extension, rotation, and lateral bending.    Lower Extremity Neuro Exam: Muscle Strength is 5/5 in all major lower extremity muscle groups. Deep Tendon Reflexes are 2+ and equal in the patellar and Achilles distributions bilaterally. Sensation is normal in all distributions.    Impression and Assessment    Low Back Pain.    Plan    Low Back Pain Plan: Medication will be added as noted.    Diagnoses and all orders for this visit:    1. Acute right-sided low back pain with right-sided sciatica (Primary)  -     XR Spine Lumbar 2 or 3 View; Future  -     predniSONE (DELTASONE) 10 MG tablet; 2 po bid x 5 days; 1 po bid x 5 days; 1 po daily x 5 days; 1/2 po daily x 4 days  Dispense: 37 tablet; Refill: " discharged. This is a hospital requirement.  • We recommend you check with your insurance for cost of medication PRIOR to admission.       Important dates to remember:    • Your POST-OP appointment in on Tuesday, February 9th at 10:30 am with Anna Arora PA-C. This appointment is located at [facility], [address] [place] [address] [address], [place].       If you should have any other questions, please do not hesitate to call me at [phone].      0            Return to Office    The patient was instructed to return for follow-up at the next scheduled visit. The patient was instructed to return sooner if the condition changes, worsens, or does not resolve. The patient was instucted to call with an update on his condition in seven days.

## 2024-10-10 ENCOUNTER — FLU SHOT (OUTPATIENT)
Dept: INTERNAL MEDICINE | Facility: CLINIC | Age: 64
End: 2024-10-10
Payer: MEDICARE

## 2024-10-10 DIAGNOSIS — Z23 NEED FOR INFLUENZA VACCINATION: Primary | ICD-10-CM

## 2024-10-10 PROCEDURE — G0008 ADMIN INFLUENZA VIRUS VAC: HCPCS | Performed by: INTERNAL MEDICINE

## 2024-10-10 PROCEDURE — 90662 IIV NO PRSV INCREASED AG IM: CPT | Performed by: INTERNAL MEDICINE

## 2024-12-09 RX ORDER — TESTOSTERONE 20.25 MG/1.25G
GEL TOPICAL
Qty: 37.5 G | Refills: 2 | Status: SHIPPED | OUTPATIENT
Start: 2024-12-09

## 2024-12-10 RX ORDER — LISINOPRIL 40 MG/1
40 TABLET ORAL DAILY
Qty: 90 TABLET | Refills: 1 | Status: SHIPPED | OUTPATIENT
Start: 2024-12-10

## 2025-01-30 ENCOUNTER — OFFICE VISIT (OUTPATIENT)
Dept: INTERNAL MEDICINE | Facility: CLINIC | Age: 65
End: 2025-01-30
Payer: MEDICARE

## 2025-01-30 VITALS
SYSTOLIC BLOOD PRESSURE: 128 MMHG | WEIGHT: 219 LBS | TEMPERATURE: 97.8 F | HEART RATE: 64 BPM | BODY MASS INDEX: 26.66 KG/M2 | DIASTOLIC BLOOD PRESSURE: 84 MMHG | RESPIRATION RATE: 16 BRPM

## 2025-01-30 DIAGNOSIS — E29.1 HYPOGONADISM IN MALE: ICD-10-CM

## 2025-01-30 DIAGNOSIS — E11.9 TYPE 2 DIABETES MELLITUS WITHOUT COMPLICATION, WITHOUT LONG-TERM CURRENT USE OF INSULIN: ICD-10-CM

## 2025-01-30 DIAGNOSIS — E78.5 HYPERLIPIDEMIA, UNSPECIFIED HYPERLIPIDEMIA TYPE: ICD-10-CM

## 2025-01-30 DIAGNOSIS — K21.9 GASTROESOPHAGEAL REFLUX DISEASE WITHOUT ESOPHAGITIS: ICD-10-CM

## 2025-01-30 DIAGNOSIS — I10 ESSENTIAL HYPERTENSION: Primary | ICD-10-CM

## 2025-01-30 LAB
ALBUMIN SERPL-MCNC: 4.3 G/DL (ref 3.5–5.2)
ALBUMIN/GLOB SERPL: 1.8 G/DL
ALP SERPL-CCNC: 65 U/L (ref 39–117)
ALT SERPL W P-5'-P-CCNC: 19 U/L (ref 1–41)
ANION GAP SERPL CALCULATED.3IONS-SCNC: 8.7 MMOL/L (ref 5–15)
AST SERPL-CCNC: 26 U/L (ref 1–40)
BASOPHILS # BLD AUTO: 0.05 10*3/MM3 (ref 0–0.2)
BASOPHILS NFR BLD AUTO: 0.8 % (ref 0–1.5)
BILIRUB BLD-MCNC: NEGATIVE MG/DL
BILIRUB SERPL-MCNC: 0.7 MG/DL (ref 0–1.2)
BUN SERPL-MCNC: 12 MG/DL (ref 8–23)
BUN/CREAT SERPL: 13.3 (ref 7–25)
CALCIUM SPEC-SCNC: 9.3 MG/DL (ref 8.6–10.5)
CHLORIDE SERPL-SCNC: 103 MMOL/L (ref 98–107)
CHOLEST SERPL-MCNC: 141 MG/DL (ref 0–200)
CLARITY, POC: CLEAR
CO2 SERPL-SCNC: 27.3 MMOL/L (ref 22–29)
COLOR UR: YELLOW
CREAT SERPL-MCNC: 0.9 MG/DL (ref 0.76–1.27)
DEPRECATED RDW RBC AUTO: 42.4 FL (ref 37–54)
EGFRCR SERPLBLD CKD-EPI 2021: 94.2 ML/MIN/1.73
EOSINOPHIL # BLD AUTO: 0.13 10*3/MM3 (ref 0–0.4)
EOSINOPHIL NFR BLD AUTO: 2.2 % (ref 0.3–6.2)
ERYTHROCYTE [DISTWIDTH] IN BLOOD BY AUTOMATED COUNT: 12 % (ref 12.3–15.4)
EXPIRATION DATE: ABNORMAL
EXPIRATION DATE: NORMAL
GLOBULIN UR ELPH-MCNC: 2.4 GM/DL
GLUCOSE SERPL-MCNC: 152 MG/DL (ref 65–99)
GLUCOSE UR STRIP-MCNC: NEGATIVE MG/DL
HBA1C MFR BLD: 7.9 % (ref 4.8–5.6)
HCT VFR BLD AUTO: 43.7 % (ref 37.5–51)
HDLC SERPL-MCNC: 43 MG/DL (ref 40–60)
HGB BLD-MCNC: 13.8 G/DL (ref 13–17.7)
IMM GRANULOCYTES # BLD AUTO: 0.02 10*3/MM3 (ref 0–0.05)
IMM GRANULOCYTES NFR BLD AUTO: 0.3 % (ref 0–0.5)
KETONES UR QL: NEGATIVE
LDLC SERPL CALC-MCNC: 77 MG/DL (ref 0–100)
LDLC/HDLC SERPL: 1.73 {RATIO}
LEUKOCYTE EST, POC: NEGATIVE
LYMPHOCYTES # BLD AUTO: 1.94 10*3/MM3 (ref 0.7–3.1)
LYMPHOCYTES NFR BLD AUTO: 32.3 % (ref 19.6–45.3)
Lab: ABNORMAL
Lab: NORMAL
MCH RBC QN AUTO: 30.3 PG (ref 26.6–33)
MCHC RBC AUTO-ENTMCNC: 31.6 G/DL (ref 31.5–35.7)
MCV RBC AUTO: 96 FL (ref 79–97)
MONOCYTES # BLD AUTO: 0.56 10*3/MM3 (ref 0.1–0.9)
MONOCYTES NFR BLD AUTO: 9.3 % (ref 5–12)
NEUTROPHILS NFR BLD AUTO: 3.31 10*3/MM3 (ref 1.7–7)
NEUTROPHILS NFR BLD AUTO: 55.1 % (ref 42.7–76)
NITRITE UR-MCNC: NEGATIVE MG/ML
NRBC BLD AUTO-RTO: 0 /100 WBC (ref 0–0.2)
PH UR: 7 [PH] (ref 5–8)
PLATELET # BLD AUTO: 179 10*3/MM3 (ref 140–450)
PMV BLD AUTO: 11.3 FL (ref 6–12)
POC CREATININE URINE: 10
POC MICROALBUMIN URINE: 10
POTASSIUM SERPL-SCNC: 4.1 MMOL/L (ref 3.5–5.2)
PROT SERPL-MCNC: 6.7 G/DL (ref 6–8.5)
PROT UR STRIP-MCNC: NEGATIVE MG/DL
RBC # BLD AUTO: 4.55 10*6/MM3 (ref 4.14–5.8)
RBC # UR STRIP: NEGATIVE /UL
SODIUM SERPL-SCNC: 139 MMOL/L (ref 136–145)
SP GR UR: 1 (ref 1–1.03)
TRIGL SERPL-MCNC: 118 MG/DL (ref 0–150)
TSH SERPL DL<=0.05 MIU/L-ACNC: 1.27 UIU/ML (ref 0.27–4.2)
UROBILINOGEN UR QL: NORMAL
VLDLC SERPL-MCNC: 21 MG/DL (ref 5–40)
WBC NRBC COR # BLD AUTO: 6.01 10*3/MM3 (ref 3.4–10.8)

## 2025-01-30 PROCEDURE — G2211 COMPLEX E/M VISIT ADD ON: HCPCS | Performed by: INTERNAL MEDICINE

## 2025-01-30 PROCEDURE — 80053 COMPREHEN METABOLIC PANEL: CPT | Performed by: INTERNAL MEDICINE

## 2025-01-30 PROCEDURE — 3074F SYST BP LT 130 MM HG: CPT | Performed by: INTERNAL MEDICINE

## 2025-01-30 PROCEDURE — 85025 COMPLETE CBC W/AUTO DIFF WBC: CPT | Performed by: INTERNAL MEDICINE

## 2025-01-30 PROCEDURE — 3079F DIAST BP 80-89 MM HG: CPT | Performed by: INTERNAL MEDICINE

## 2025-01-30 PROCEDURE — 81003 URINALYSIS AUTO W/O SCOPE: CPT | Performed by: INTERNAL MEDICINE

## 2025-01-30 PROCEDURE — 1125F AMNT PAIN NOTED PAIN PRSNT: CPT | Performed by: INTERNAL MEDICINE

## 2025-01-30 PROCEDURE — 83036 HEMOGLOBIN GLYCOSYLATED A1C: CPT | Performed by: INTERNAL MEDICINE

## 2025-01-30 PROCEDURE — 82044 UR ALBUMIN SEMIQUANTITATIVE: CPT | Performed by: INTERNAL MEDICINE

## 2025-01-30 PROCEDURE — 99214 OFFICE O/P EST MOD 30 MIN: CPT | Performed by: INTERNAL MEDICINE

## 2025-01-30 PROCEDURE — 80061 LIPID PANEL: CPT | Performed by: INTERNAL MEDICINE

## 2025-01-30 PROCEDURE — 84443 ASSAY THYROID STIM HORMONE: CPT | Performed by: INTERNAL MEDICINE

## 2025-01-30 PROCEDURE — 84402 ASSAY OF FREE TESTOSTERONE: CPT | Performed by: INTERNAL MEDICINE

## 2025-01-30 PROCEDURE — 84403 ASSAY OF TOTAL TESTOSTERONE: CPT | Performed by: INTERNAL MEDICINE

## 2025-01-30 RX ORDER — ASPIRIN 81 MG/1
81 TABLET ORAL DAILY
COMMUNITY

## 2025-01-30 NOTE — PROGRESS NOTES
Chief Complaint   Patient presents with    Follow-up     3 month follow up chronic medical problems       History of Present Illness      The patient presents for follow-up of hypogonadism. He is using testosterone gel. He has had improvement in his symptoms. He has no medication side effects including skin reactions, edema, priapism, headache or acne. The patient's libido is improved. The patient reports no associated symptoms, including weight gain, breast enlargment or fatigue. He denies testicular shrinkage. He is able to ejaculate. He has a history of diabetes mellitus-2 but he does not have heart disease, peripheral vascular disease, depression, excessive alcohol intake, mumps orchitis or hemochromatosis.    The patient presents for a follow-up related to hyperlipidemia. He is following a low fat diet. He reports that he is exercising. He is taking rosuvastatin. The patient is taking his medication as prescribed. He reports no medication side effects, including muscle cramps, abdominal pain, headaches or weakness. He denies chest pain, shortness of breath, orthopnea, paroxysmal nocturnal dyspnea, dyspnea on exertion, edema, palpitations or syncope.    The patient presents for a follow-up related to Type 2 Diabetes Mellitus. He does not check his blood sugars at home. He denies hypoglycemic symptoms. The patient denies polyuria, polydypsia or polyphagia. He reports no symptoms of neuropathy. The patient is not on medication for his Type 2 Diabetes Mellitus. The patient does check his feet daily at home.    The patient presents for a follow-up related to hypertension. The patient reports that he has had no headaches or blurred vision. He states that he is taking his medication as prescribed. He is not having medication side effects.    The patient presents for a follow-up related to GERD. The patient is not on medication for his gastroesophageal reflux. He reports no abdominal pain, belching, diarrhea,  dysphagia, early satiety, heartburn, hoarseness, nausea, odynophagia, rectal bleeding, vomiting or weight loss. The GERD has no known aggravating factors.    Medications      Current Outpatient Medications:     aspirin 81 MG EC tablet, Take 1 tablet by mouth Daily., Disp: , Rfl:     coenzyme Q10 100 MG capsule, Take 2 capsules by mouth Daily., Disp: , Rfl:     fluticasone (FLONASE) 50 MCG/ACT nasal spray, into each nostril Daily As Needed., Disp: , Rfl:     lisinopril (PRINIVIL,ZESTRIL) 40 MG tablet, TAKE 1 TABLET BY MOUTH DAILY, Disp: 90 tablet, Rfl: 1    metoprolol tartrate (LOPRESSOR) 25 MG tablet, Take 1 tablet by mouth 2 (Two) Times a Day. (Patient taking differently: Take 1 tablet by mouth 2 (Two) Times a Day. 9/17/24 Reports taking once daily in am), Disp: 180 tablet, Rfl: 3    rosuvastatin (CRESTOR) 20 MG tablet, TAKE 1 TABLET BY MOUTH DAILY, Disp: 90 tablet, Rfl: 1    tadalafil (CIALIS) 5 MG tablet, TAKE 1 TABLET BY MOUTH DAILY, Disp: 90 tablet, Rfl: 1    Testosterone 20.25 MG/1.25GM (1.62%) gel, APPLY 1 PACKET TO THE SKIN DAILY, Disp: 37.5 g, Rfl: 2     Allergies    No Known Allergies    Problem List    Patient Active Problem List   Diagnosis    Diabetes mellitus    Dyslipidemia    Essential hypertension    Gastroesophageal reflux disease    Enlarged prostate without lower urinary tract symptoms (luts)    Erectile dysfunction of nonorganic origin    Acute bronchitis    Atopic rhinitis    Liver cyst    Coronary artery disease involving native coronary artery of native heart without angina pectoris       Medications, Allergies, Problems List and Past History were reviewed and updated.    Physical Examination    /84   Pulse 64   Temp 97.8 °F (36.6 °C) (Infrared)   Resp 16   Wt 99.3 kg (219 lb)   BMI 26.66 kg/m²       HEENT: Head- Normocephalic Atraumatic. Facies- Within normal limits. Pinnas- Normal texture and shape bilaterally. Canals- Normal bilaterally. TMs- Normal bilaterally. Nares- Patent  bilaterally. Nasal Septum- is normal. There is no tenderness to palpation over the frontal or maxillary sinuses. Lids- Normal bilaterally. Conjunctiva- Clear bilaterally. Sclera- Anicteric bilaterally. Oropharynx- Moist with no lesions. Tonsils- No enlargement, erythema or exudate.    Neck: Thyroid- non enlarged, symmetric and has no nodules. No bruits are detected. ROM- Normal Range of Motion with no rigidity.    Lungs: Auscultation- Clear to auscultation bilaterally. There are no retractions, clubbing or cyanosis. The Expiratory to Inspiratory ratio is equal.    Cardiovascular: There are no carotid bruits. Heart- Normal Rate with Regular rhythm and no murmurs. There are no gallops. There are no rubs. In the lower extremities there is no edema. The upper extremities do not have edema.      Abdomen: Soft, benign, non-tender with no masses, hernias, organomegaly or scars.            Impression and Assessment    Hyperlipidemia.    Type 2 Diabetes Mellitus without complication without insulin usage.    Essential Hypertension.    Gastroesophageal Reflux Disease.    Hypogonadism.    Plan    Gastroesophageal Reflux Disease Plan: The patient was instructed to continue the current medications.    Hyperlipidemia Plan: The patient was instructed to exercise daily, eat a low fat diet and continue his medications.    Essential Hypertension Plan: The patient was instructed to continue the current medications.    Type 2 Diabetes Mellitus without complication without insulin usage Plan: Further plans will be formulated after test results are reviewed.    Hypogonadism Plan: The patient was instructed to continue the current medications.    Diagnoses and all orders for this visit:    1. Essential hypertension (Primary)  -     CBC & Differential; Future  -     Comprehensive Metabolic Panel; Future  -     POC Microalbumin; Future  -     POC Urinalysis Dipstick, Automated; Future  -     TSH; Future  -     TSH  -     Comprehensive  Metabolic Panel  -     CBC & Differential  -     POC Urinalysis Dipstick, Automated  -     POC Microalbumin    2. Type 2 diabetes mellitus without complication, without long-term current use of insulin  -     Comprehensive Metabolic Panel; Future  -     Hemoglobin A1c; Future  -     Hemoglobin A1c  -     Comprehensive Metabolic Panel    3. Hyperlipidemia, unspecified hyperlipidemia type  -     Comprehensive Metabolic Panel; Future  -     Lipid Panel; Future  -     Lipid Panel  -     Comprehensive Metabolic Panel    4. Hypogonadism in male  -     CBC & Differential; Future  -     Comprehensive Metabolic Panel; Future  -     Testosterone, Free, Total; Future  -     Testosterone, Free, Total  -     Comprehensive Metabolic Panel  -     CBC & Differential    5. Gastroesophageal reflux disease without esophagitis            Return to Office    The patient was instructed to return for follow-up in 6 months. The patient was instructed to return sooner if the condition changes, worsens, or does not resolve.

## 2025-02-04 LAB
TESTOST FREE SERPL-MCNC: 5.1 PG/ML (ref 6.6–18.1)
TESTOST SERPL-MCNC: 498 NG/DL (ref 264–916)

## 2025-02-19 DIAGNOSIS — N52.9 ERECTILE DYSFUNCTION, UNSPECIFIED ERECTILE DYSFUNCTION TYPE: ICD-10-CM

## 2025-02-19 RX ORDER — TADALAFIL 5 MG/1
5 TABLET ORAL DAILY
Qty: 90 TABLET | Refills: 1 | Status: SHIPPED | OUTPATIENT
Start: 2025-02-19

## 2025-03-24 RX ORDER — TESTOSTERONE 20.25 MG/1.25G
GEL TOPICAL
Qty: 37.5 G | Refills: 2 | Status: SHIPPED | OUTPATIENT
Start: 2025-03-24

## 2025-04-07 RX ORDER — ROSUVASTATIN CALCIUM 20 MG/1
20 TABLET, COATED ORAL DAILY
Qty: 90 TABLET | Refills: 1 | Status: SHIPPED | OUTPATIENT
Start: 2025-04-07